# Patient Record
Sex: FEMALE | Race: WHITE | NOT HISPANIC OR LATINO | Employment: FULL TIME | URBAN - METROPOLITAN AREA
[De-identification: names, ages, dates, MRNs, and addresses within clinical notes are randomized per-mention and may not be internally consistent; named-entity substitution may affect disease eponyms.]

---

## 2017-03-24 DIAGNOSIS — J31.0 CHRONIC RHINITIS: ICD-10-CM

## 2017-03-24 DIAGNOSIS — F41.9 ANXIETY: ICD-10-CM

## 2017-03-27 RX ORDER — FLUTICASONE PROPIONATE 50 MCG
SPRAY, SUSPENSION (ML) NASAL
Qty: 16 ML | Refills: 5 | Status: SHIPPED | OUTPATIENT
Start: 2017-03-27 | End: 2017-09-22

## 2017-03-27 NOTE — TELEPHONE ENCOUNTER
Fluticasone 50 mcg       Last Written Prescription Date: 01/29/2016  Last Fill Quantity: 16g, # refills: 11    Last Office Visit with [facility], [facility] or [facility] prescribing provider:  09/16/2016   Future Office Visit:       Date of Last Asthma Action Plan Letter:   There are no preventive care reminders to display for this patient.   Asthma Control Test: No flowsheet data found.    Date of Last Spirometry Test:   No results found for this or any previous visit.      Escitalopram 10 mg     Last Written Prescription Date:   Last Fill Quantity: , # refills:   Last Office Visit with [facility] primary care provider:  09/16/2016        Last PHQ-9 score on record=   PHQ-9 SCORE 7/25/2016   Total Score 1

## 2017-03-27 NOTE — TELEPHONE ENCOUNTER
Flonase  Prescription approved per G Refill Protocol.  Janki Matias RN    Lexapro  Routing refill request to provider for review/approval because:  Drug not active on patient's medication list.  Medication stopped 09/2016  Janki Matias RN

## 2017-03-28 RX ORDER — ESCITALOPRAM OXALATE 10 MG/1
TABLET ORAL
Qty: 90 TABLET | Refills: 1 | Status: SHIPPED | OUTPATIENT
Start: 2017-03-28 | End: 2017-04-25
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
## 2017-04-20 ENCOUNTER — MYC MEDICAL ADVICE (OUTPATIENT)
Dept: FAMILY MEDICINE | Facility: OTHER | Age: 27
End: 2017-04-20

## 2017-04-23 ENCOUNTER — MYC MEDICAL ADVICE (OUTPATIENT)
Dept: FAMILY MEDICINE | Facility: OTHER | Age: 27
End: 2017-04-23

## 2017-04-23 DIAGNOSIS — F41.9 ANXIETY: ICD-10-CM

## 2017-04-25 RX ORDER — ESCITALOPRAM OXALATE 20 MG/1
20 TABLET ORAL DAILY
Qty: 90 TABLET | Refills: 1 | Status: SHIPPED | OUTPATIENT
Start: 2017-04-25 | End: 2017-09-22

## 2017-08-31 ENCOUNTER — MYC MEDICAL ADVICE (OUTPATIENT)
Dept: FAMILY MEDICINE | Facility: OTHER | Age: 27
End: 2017-08-31

## 2017-08-31 DIAGNOSIS — L70.9 ACNE, UNSPECIFIED ACNE TYPE: ICD-10-CM

## 2017-08-31 RX ORDER — DROSPIRENONE AND ETHINYL ESTRADIOL 0.03MG-3MG
1 KIT ORAL DAILY
Qty: 84 TABLET | Refills: 0 | Status: SHIPPED | OUTPATIENT
Start: 2017-08-31 | End: 2017-09-22

## 2017-08-31 NOTE — TELEPHONE ENCOUNTER
I believe she means any Friday.  I would go ahead and schedule an appointment for her on a Friday some time and then can refill her medication until that appointment.

## 2017-08-31 NOTE — TELEPHONE ENCOUNTER
OMER   Last Written Prescription Date:  84  Last Fill Quantity: 4,   # refills: 9/16/2016  Last Office Visit with [facility], [facility] or [facility] prescribing provider: 9/16/16  Future Office visit:       Routing refill request to provider for review/approval because:  Drug not on the [facility], [facility] or [facility] refill protocol or controlled substance      LM for pt to call back to clinic. Please see below. TC doesn't have appt on on Friday. Would she be willing to see another provider? Please help her setup an appt.  Routed to  for refill     Nancie Moore MA

## 2017-08-31 NOTE — TELEPHONE ENCOUNTER
Pt had called back and that day wont work so she is going to be seen on 9/22 at 740am. Please send enough medication.

## 2017-09-19 NOTE — PROGRESS NOTES
SUBJECTIVE:   CC: Aniyah Evans is an 27 year old woman who presents for preventive health visit.     Physical   Annual:     Getting at least 3 servings of Calcium per day::  Yes    Bi-annual eye exam::  Yes    Dental care twice a year::  Yes    Sleep apnea or symptoms of sleep apnea::  None    Diet::  Regular (no restrictions)    Frequency of exercise::  1 day/week    Duration of exercise::  Less than 15 minutes    Taking medications regularly::  Yes    Medication side effects::  Not applicable    Additional concerns today::  No      Patient has several moles she would like looked at.     Today's PHQ-2 Score:   PHQ-2 ( 1999 [employer]) 9/22/2017   Q1: Little interest or pleasure in doing things 1   Q2: Feeling down, depressed or hopeless 0   PHQ-2 Score 1   Q1: Little interest or pleasure in doing things Several days   Q2: Feeling down, depressed or hopeless Not at all   PHQ-2 Score 1     Answers for HPI/ROS submitted by the patient on 9/22/2017   PHQ-2 Score: 1    Abuse: Current or Past(Physical, Sexual or Emotional)- No  Do you feel safe in your environment - Yes    Social History   Substance Use Topics     Smoking status: Never Smoker     Smokeless tobacco: Never Used      Comment: no smokers in household     Alcohol use Yes      Comment: occ     The patient does not drink >3 drinks per day nor >7 drinks per week.    Reviewed orders with patient.  Reviewed health maintenance and updated orders accordingly - Yes    Mammogram not appropriate for this patient based on age.    Pertinent mammograms are reviewed under the imaging tab.  History of abnormal Pap smear: NO - age 21-29 PAP every 3 years recommended    Reviewed and updated as needed this visit by clinical staff  Tobacco  Allergies  Meds  Problems  Med Hx  Surg Hx  Fam Hx  Soc Hx          Reviewed and updated as needed this visit by Provider  Allergies  Meds  Problems          ROS:  C: NEGATIVE for fever, chills, change in weight  I: has multiple  "nevi, she isn't sure if they have changed, but thinks some may be different  E: NEGATIVE for vision changes or irritation  ENT: NEGATIVE for ear, mouth and throat problems  R: NEGATIVE for significant cough or SOB  B: NEGATIVE for masses, tenderness or discharge  CV: NEGATIVE for chest pain, palpitations or peripheral edema  GI: NEGATIVE for nausea, abdominal pain, heartburn, or change in bowel habits  : NEGATIVE for unusual urinary or vaginal symptoms. Periods are regular.  M: NEGATIVE for significant arthralgias or myalgia  N: NEGATIVE for weakness, dizziness or paresthesias  P: NEGATIVE for changes in mood or affect     OBJECTIVE:   /76 (BP Location: Left arm, Patient Position: Chair, Cuff Size: Adult Large)  Pulse 84  Temp 98.4  F (36.9  C) (Temporal)  Resp 16  Ht 5' 5.35\" (1.66 m)  Wt 175 lb (79.4 kg)  SpO2 98%  Breastfeeding? No  BMI 28.81 kg/m2  EXAM:  GENERAL: healthy, alert and no distress  EYES: Eyes grossly normal to inspection, PERRL and conjunctivae and sclerae normal  HENT: ear canals and TM's normal, nose and mouth without ulcers or lesions  NECK: no adenopathy, no asymmetry, masses, or scars and thyroid normal to palpation  RESP: lungs clear to auscultation - no rales, rhonchi or wheezes  BREAST: normal without masses, tenderness or nipple discharge and no palpable axillary masses or adenopathy  CV: regular rate and rhythm, normal S1 S2, no S3 or S4, no murmur, click or rub, no peripheral edema and peripheral pulses strong  ABDOMEN: soft, nontender, no hepatosplenomegaly, no masses and bowel sounds normal  MS: no gross musculoskeletal defects noted, no edema  SKIN:  Multiple nevi ranging in size from 1-5 mm, some with irregular borders or irregular pigmentation  NEURO: Normal strength and tone, mentation intact and speech normal  PSYCH: mentation appears normal, affect normal/bright    ASSESSMENT/PLAN:   1. Encounter for routine adult health examination without abnormal " "findings      2. Chronic rhinitis, unspecified type  Controlled    - fluticasone (FLONASE) 50 MCG/ACT spray; PLACE 1 TO 2 SPRAYS INTO BOTH NOSTRILS DAILY AS NEEDED  Dispense: 16 mL; Refill: 11    3. Anxiety  Feels controlled on Lexapro.    - escitalopram (LEXAPRO) 20 MG tablet; Take 1 tablet (20 mg) by mouth daily  Dispense: 90 tablet; Refill: 3    4. Acne vulgaris  Controlled.    - drospirenone-ethinyl estradiol (OMER) 3-0.03 MG per tablet; Take 1 tablet by mouth daily  Dispense: 84 tablet; Refill: 3  - clindamycin (CLEOCIN T) 1 % solution; Apply topically 2 times daily ### DO NOT FILL NOW.  Please update patient's profile to reflect additional refills.  ####  Dispense: 60 mL; Refill: 11    5. Atypical nevi  Due to multiple mildly atypical nevi, will refer to Dermatology to evaluate and determine which may need biopsy.    - DERMATOLOGY REFERRAL    COUNSELING:  Reviewed preventive health counseling, as reflected in patient instructions         reports that she has never smoked. She has never used smokeless tobacco.    Estimated body mass index is 28.81 kg/(m^2) as calculated from the following:    Height as of this encounter: 5' 5.35\" (1.66 m).    Weight as of this encounter: 175 lb (79.4 kg).   Weight management plan: Discussed healthy diet and exercise guidelines and patient will follow up in 12 months in clinic to re-evaluate.    Counseling Resources:  ATP IV Guidelines  Pooled Cohorts Equation Calculator  Breast Cancer Risk Calculator  FRAX Risk Assessment  ICSI Preventive Guidelines  Dietary Guidelines for Americans, 2010  USDA's MyPlate  ASA Prophylaxis  Lung CA Screening    Jaycee Schultz MD  [facility]"

## 2017-09-22 ENCOUNTER — OFFICE VISIT (OUTPATIENT)
Dept: FAMILY MEDICINE | Facility: OTHER | Age: 27
End: 2017-09-22
Payer: COMMERCIAL

## 2017-09-22 VITALS
BODY MASS INDEX: 29.16 KG/M2 | TEMPERATURE: 98.4 F | OXYGEN SATURATION: 98 % | HEIGHT: 65 IN | HEART RATE: 84 BPM | DIASTOLIC BLOOD PRESSURE: 76 MMHG | WEIGHT: 175 LBS | SYSTOLIC BLOOD PRESSURE: 114 MMHG | RESPIRATION RATE: 16 BRPM

## 2017-09-22 DIAGNOSIS — J31.0 CHRONIC RHINITIS, UNSPECIFIED TYPE: ICD-10-CM

## 2017-09-22 DIAGNOSIS — F41.9 ANXIETY: ICD-10-CM

## 2017-09-22 DIAGNOSIS — Z00.00 ENCOUNTER FOR ROUTINE ADULT HEALTH EXAMINATION WITHOUT ABNORMAL FINDINGS: Primary | ICD-10-CM

## 2017-09-22 DIAGNOSIS — D22.9 ATYPICAL NEVI: ICD-10-CM

## 2017-09-22 DIAGNOSIS — L70.0 ACNE VULGARIS: ICD-10-CM

## 2017-09-22 PROCEDURE — 99395 PREV VISIT EST AGE 18-39: CPT | Performed by: FAMILY MEDICINE

## 2017-09-22 RX ORDER — FLUTICASONE PROPIONATE 50 MCG
SPRAY, SUSPENSION (ML) NASAL
Qty: 16 ML | Refills: 11 | Status: SHIPPED | OUTPATIENT
Start: 2017-09-22 | End: 2018-10-02

## 2017-09-22 RX ORDER — DROSPIRENONE AND ETHINYL ESTRADIOL 0.03MG-3MG
1 KIT ORAL DAILY
Qty: 84 TABLET | Refills: 3 | Status: SHIPPED | OUTPATIENT
Start: 2017-09-22 | End: 2017-11-02

## 2017-09-22 RX ORDER — ESCITALOPRAM OXALATE 20 MG/1
20 TABLET ORAL DAILY
Qty: 90 TABLET | Refills: 3 | Status: SHIPPED | OUTPATIENT
Start: 2017-09-22 | End: 2018-10-02

## 2017-09-22 RX ORDER — CLINDAMYCIN PHOSPHATE 11.9 MG/ML
SOLUTION TOPICAL 2 TIMES DAILY
Qty: 60 ML | Refills: 11 | Status: SHIPPED | OUTPATIENT
Start: 2017-09-22 | End: 2018-10-02

## 2017-09-22 ASSESSMENT — PAIN SCALES - GENERAL: PAINLEVEL: NO PAIN (0)

## 2017-09-22 NOTE — MR AVS SNAPSHOT
After Visit Summary   9/22/2017    Aniyah Evans    MRN: [id number]           Patient Information     Date Of Birth          1990        Visit Information        Provider Department      9/22/2017 7:40 AM Jaycee Schultz MD [facility]        Today's Diagnoses     Encounter for routine adult health examination without abnormal findings    -  1    Chronic rhinitis, unspecified type        Anxiety        Acne vulgaris        Atypical nevi           Follow-ups after your visit        Additional Services     DERMATOLOGY REFERRAL       Your provider has referred you to: [facility]: [facility] [phone]   [email or web address]  [facility] Shelly Gutierrez [phone]   [email or web address]    Please be aware that coverage of these services is subject to the terms and limitations of your health insurance plan.  Call member services at your health plan with any benefit or coverage questions.      Please bring the following with you to your appointment:    (1) Any X-Rays, CTs or MRIs which have been performed.  Contact the facility where they were done to arrange for  prior to your scheduled appointment.    (2) List of current medications  (3) This referral request   (4) Any documents/labs given to you for this referral                  Who to contact     If you have questions or need follow up information about today's clinic visit or your schedule please contact [facility] directly at [phone].  Normal or non-critical lab and imaging results will be communicated to you by MyChart, letter or phone within 4 business days after the clinic has received the results. If you do not hear from us within 7 days, please contact the clinic through MyChart or phone. If you have a critical or abnormal lab result, we will notify you by phone as soon as  "possible.  Submit refill requests through Heliatek or call your pharmacy and they will forward the refill request to us. Please allow 3 business days for your refill to be completed.          Additional Information About Your Visit        OncoGenexharLoctronix Information     Heliatek gives you secure access to your electronic health record. If you see a primary care provider, you can also send messages to your care team and make appointments. If you have questions, please call your primary care clinic.  If you do not have a primary care provider, please call [phone] and they will assist you.        Care EveryWhere ID     This is your Care EveryWhere ID. This could be used by other organizations to access your [facility] medical records  [id number]        Your Vitals Were     Pulse Temperature Respirations Height Pulse Oximetry Breastfeeding?    84 98.4  F (36.9  C) (Temporal) 16 5' 5.35\" (1.66 m) 98% No    BMI (Body Mass Index)                   28.81 kg/m2            Blood Pressure from Last 3 Encounters:   09/22/17 114/76   09/16/16 120/66   09/08/16 115/65    Weight from Last 3 Encounters:   09/22/17 175 lb (79.4 kg)   09/16/16 159 lb (72.1 kg)   09/07/16 145 lb (65.8 kg)              We Performed the Following     DERMATOLOGY REFERRAL          Today's Medication Changes          These changes are accurate as of: 9/22/17  8:14 AM.  If you have any questions, ask your nurse or doctor.               These medicines have changed or have updated prescriptions.        Dose/Directions    clindamycin 1 % solution   Commonly known as:  CLEOCIN T   This may have changed:  additional instructions   Used for:  Acne vulgaris   Changed by:  Jaycee Schultz MD        Apply topically 2 times daily ### DO NOT FILL NOW.  Please update patient's profile to reflect additional refills.  ####   Quantity:  60 mL   Refills:  11       fluticasone 50 MCG/ACT spray   Commonly known as:  FLONASE   This may have changed:  See the new " instructions.   Used for:  Chronic rhinitis, unspecified type   Changed by:  Jaycee Schultz MD        PLACE 1 TO 2 SPRAYS INTO BOTH NOSTRILS DAILY AS NEEDED   Quantity:  16 mL   Refills:  11            Where to get your medicines      These medications were sent to [facility] [address] [place] AT [facility] [address] [place], [place] [address]     Phone:  [phone]     clindamycin 1 % solution    drospirenone-ethinyl estradiol 3-0.03 MG per tablet    escitalopram 20 MG tablet    fluticasone 50 MCG/ACT spray                Primary Care Provider Office Phone # Fax #    Jaycee Schultz [phone]       [address] [place] [address] [address]  [facility] [address]        Equal Access to Services     [facility]: Hadii alfred amaya hadasho Soomaali, waaxda luqadaha, qaybta kaalmada adeegyada, jaron ryan hayhao carreon . So [facility] [phone].    ATENCIÓN: Si habla español, tiene a bueno disposición servicios gratuitos de asistencia lingüística. [facility] [phone].    We comply with applicable federal civil rights laws and [place] laws. We do not discriminate on the basis of race, color, national origin, age, disability sex, sexual orientation or gender identity.            Thank you!     Thank you for choosing [facility]  for your care. Our goal is always to provide you with excellent care. Hearing back from our patients is one way we can continue to improve our services. Please take a few minutes to complete the written survey that you may receive in the mail after your visit with us. Thank you!             Your Updated Medication List - Protect others around you: Learn how to safely use, store and throw away your medicines at www.disposemymeds.org.          This list is accurate as of: 9/22/17  8:14 AM.  Always use your most recent med list.                   Brand Name Dispense Instructions for use Diagnosis    AZO  CRANBERRY PO           clindamycin 1 % solution    CLEOCIN T    60 mL    Apply topically 2 times daily ### DO NOT FILL NOW.  Please update patient's profile to reflect additional refills.  ####    Acne vulgaris       drospirenone-ethinyl estradiol 3-0.03 MG per tablet    OMER    84 tablet    Take 1 tablet by mouth daily    Acne vulgaris       escitalopram 20 MG tablet    LEXAPRO    90 tablet    Take 1 tablet (20 mg) by mouth daily    Anxiety       fluticasone 50 MCG/ACT spray    FLONASE    16 mL    PLACE 1 TO 2 SPRAYS INTO BOTH NOSTRILS DAILY AS NEEDED    Chronic rhinitis, unspecified type

## 2017-09-22 NOTE — NURSING NOTE
"Chief Complaint   Patient presents with     Physical     Panel Management     height, flu, lipid       Initial /76 (BP Location: Left arm, Patient Position: Chair, Cuff Size: Adult Large)  Pulse 84  Temp 98.4  F (36.9  C) (Temporal)  Resp 16  Ht 5' 5.35\" (1.66 m)  Wt 175 lb (79.4 kg)  SpO2 98%  Breastfeeding? No  BMI 28.81 kg/m2 Estimated body mass index is 28.81 kg/(m^2) as calculated from the following:    Height as of this encounter: 5' 5.35\" (1.66 m).    Weight as of this encounter: 175 lb (79.4 kg).  Medication Reconciliation: complete   Danielle Power CMA      "

## 2017-10-31 DIAGNOSIS — L70.9 ACNE, UNSPECIFIED ACNE TYPE: ICD-10-CM

## 2017-11-02 RX ORDER — DROSPIRENONE AND ETHINYL ESTRADIOL 0.03MG-3MG
KIT ORAL
Qty: 84 TABLET | Refills: 3 | Status: SHIPPED | OUTPATIENT
Start: 2017-11-02 | End: 2018-10-02

## 2017-11-02 NOTE — TELEPHONE ENCOUNTER
Routing refill request to provider for review/approval because:  Drug not active on patient's medication list    Emma Whiting RN

## 2018-01-30 ENCOUNTER — OFFICE VISIT (OUTPATIENT)
Dept: FAMILY MEDICINE | Facility: OTHER | Age: 28
End: 2018-01-30
Payer: COMMERCIAL

## 2018-01-30 VITALS
TEMPERATURE: 98.2 F | SYSTOLIC BLOOD PRESSURE: 116 MMHG | OXYGEN SATURATION: 96 % | RESPIRATION RATE: 16 BRPM | DIASTOLIC BLOOD PRESSURE: 82 MMHG | WEIGHT: 175 LBS | BODY MASS INDEX: 29.88 KG/M2 | HEART RATE: 92 BPM | HEIGHT: 64 IN

## 2018-01-30 DIAGNOSIS — Z20.2 EXPOSURE TO CHLAMYDIA: ICD-10-CM

## 2018-01-30 DIAGNOSIS — R07.0 THROAT PAIN: Primary | ICD-10-CM

## 2018-01-30 LAB
DEPRECATED S PYO AG THROAT QL EIA: NORMAL
SPECIMEN SOURCE: NORMAL

## 2018-01-30 PROCEDURE — 87491 CHLMYD TRACH DNA AMP PROBE: CPT | Performed by: FAMILY MEDICINE

## 2018-01-30 PROCEDURE — 87880 STREP A ASSAY W/OPTIC: CPT | Performed by: FAMILY MEDICINE

## 2018-01-30 PROCEDURE — 87591 N.GONORRHOEAE DNA AMP PROB: CPT | Performed by: FAMILY MEDICINE

## 2018-01-30 PROCEDURE — 87081 CULTURE SCREEN ONLY: CPT | Performed by: FAMILY MEDICINE

## 2018-01-30 PROCEDURE — 99213 OFFICE O/P EST LOW 20 MIN: CPT | Performed by: FAMILY MEDICINE

## 2018-01-30 RX ORDER — AZITHROMYCIN 500 MG/1
1000 TABLET, FILM COATED ORAL ONCE
Qty: 2 TABLET | Refills: 0 | Status: SHIPPED | OUTPATIENT
Start: 2018-01-30 | End: 2018-01-30

## 2018-01-30 NOTE — NURSING NOTE
"Chief Complaint   Patient presents with     Pharyngitis     STD       Initial /82 (BP Location: Left arm, Patient Position: Chair, Cuff Size: Adult Regular)  Pulse 92  Temp 98.2  F (36.8  C) (Temporal)  Resp 16  Ht 5' 4\" (1.626 m)  Wt 175 lb (79.4 kg)  SpO2 96%  BMI 30.04 kg/m2 Estimated body mass index is 30.04 kg/(m^2) as calculated from the following:    Height as of this encounter: 5' 4\" (1.626 m).    Weight as of this encounter: 175 lb (79.4 kg).  Medication Reconciliation: complete   Danielle Power CMA      "

## 2018-01-30 NOTE — MR AVS SNAPSHOT
"              After Visit Summary   1/30/2018    Aniyah Evans    MRN: [id number]           Patient Information     Date Of Birth          1990        Visit Information        Provider Department      1/30/2018 12:00 PM Jaycee Schultz MD [facility]        Today's Diagnoses     Throat pain    -  1    Exposure to chlamydia           Follow-ups after your visit        Who to contact     If you have questions or need follow up information about today's clinic visit or your schedule please contact [facility] directly at [phone].  Normal or non-critical lab and imaging results will be communicated to you by PublicRelayhart, letter or phone within 4 business days after the clinic has received the results. If you do not hear from us within 7 days, please contact the clinic through Peekyt or phone. If you have a critical or abnormal lab result, we will notify you by phone as soon as possible.  Submit refill requests through EverybodyCar or call your pharmacy and they will forward the refill request to us. Please allow 3 business days for your refill to be completed.          Additional Information About Your Visit        MyChart Information     EverybodyCar gives you secure access to your electronic health record. If you see a primary care provider, you can also send messages to your care team and make appointments. If you have questions, please call your primary care clinic.  If you do not have a primary care provider, please call [phone] and they will assist you.        Care EveryWhere ID     This is your Care EveryWhere ID. This could be used by other organizations to access your [facility] medical records  [id number]        Your Vitals Were     Pulse Temperature Respirations Height Pulse Oximetry BMI (Body Mass Index)    92 98.2  F (36.8  C) (Temporal) 16 5' 4\" (1.626 m) 96% 30.04 kg/m2       Blood Pressure from Last 3 Encounters:   01/30/18 116/82   09/22/17 114/76   09/16/16 " 120/66    Weight from Last 3 Encounters:   01/30/18 175 lb (79.4 kg)   09/22/17 175 lb (79.4 kg)   09/16/16 159 lb (72.1 kg)              We Performed the Following     Beta strep group A culture     CHLAMYDIA TRACHOMATIS PCR     NEISSERIA GONORRHOEA PCR     Strep, Rapid Screen          Today's Medication Changes          These changes are accurate as of 1/30/18  1:56 PM.  If you have any questions, ask your nurse or doctor.               Start taking these medicines.        Dose/Directions    azithromycin 500 MG tablet   Commonly known as:  ZITHROMAX   Used for:  Exposure to chlamydia   Started by:  Jaycee Schultz MD        Dose:  1000 mg   Take 2 tablets (1,000 mg) by mouth once for 1 dose   Quantity:  2 tablet   Refills:  0            Where to get your medicines      These medications were sent to [facility] [address] [place] [address] [facility] AT [facility] [address] [facility]  [address] [facility], [place] [address]     Phone:  [phone]     azithromycin 500 MG tablet                Primary Care Provider Office Phone # Fax #    Jaycee Schultz [phone]       [address] [facility] [address]  [place] [address]        Equal Access to Services     [facility]: Hadii alfred amyaa hadasho Soomaali, waaxda luqadaha, qaybta kaalmada adeegyada, jaron dutta. So [facility] [phone].    ATENCIÓN: Si habla español, tiene a bueno disposición servicios gratuitos de asistencia lingüística. Llame al [phone].    We comply with applicable federal civil rights laws and [place] laws. We do not discriminate on the basis of race, color, national origin, age, disability, sex, sexual orientation, or gender identity.            Thank you!     Thank you for choosing [facility]  for your care. Our goal is always to provide you with excellent care. Hearing back from our patients is one way we can continue to improve our services. Please take a few minutes  to complete the written survey that you may receive in the mail after your visit with us. Thank you!             Your Updated Medication List - Protect others around you: Learn how to safely use, store and throw away your medicines at www.disposemymeds.org.          This list is accurate as of 1/30/18  1:56 PM.  Always use your most recent med list.                   Brand Name Dispense Instructions for use Diagnosis    azithromycin 500 MG tablet    ZITHROMAX    2 tablet    Take 2 tablets (1,000 mg) by mouth once for 1 dose    Exposure to chlamydia       clindamycin 1 % solution    CLEOCIN T    60 mL    Apply topically 2 times daily ### DO NOT FILL NOW.  Please update patient's profile to reflect additional refills.  ####    Acne vulgaris       CRANBERRY CONCENTRATE/VITAMINC 140-100 MG Caps   Generic drug:  Cranberry-Vitamin C           escitalopram 20 MG tablet    LEXAPRO    90 tablet    Take 1 tablet (20 mg) by mouth daily    Anxiety       fluticasone 50 MCG/ACT spray    FLONASE    16 mL    PLACE 1 TO 2 SPRAYS INTO BOTH NOSTRILS DAILY AS NEEDED    Chronic rhinitis, unspecified type       SANDHYA 3-0.03 MG per tablet   Generic drug:  drospirenone-ethinyl estradiol     84 tablet    TAKE 1 TABLET BY MOUTH DAILY    Acne, unspecified acne type
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
## 2018-01-31 LAB
BACTERIA SPEC CULT: NORMAL
C TRACH DNA SPEC QL NAA+PROBE: NEGATIVE
N GONORRHOEA DNA SPEC QL NAA+PROBE: NEGATIVE
SPECIMEN SOURCE: NORMAL

## 2018-10-02 ENCOUNTER — OFFICE VISIT (OUTPATIENT)
Dept: FAMILY MEDICINE | Facility: OTHER | Age: 28
End: 2018-10-02
Payer: COMMERCIAL

## 2018-10-02 VITALS
BODY MASS INDEX: 27.46 KG/M2 | TEMPERATURE: 98.7 F | RESPIRATION RATE: 16 BRPM | HEART RATE: 82 BPM | OXYGEN SATURATION: 95 % | DIASTOLIC BLOOD PRESSURE: 70 MMHG | WEIGHT: 160 LBS | SYSTOLIC BLOOD PRESSURE: 104 MMHG

## 2018-10-02 DIAGNOSIS — R07.0 THROAT PAIN: ICD-10-CM

## 2018-10-02 DIAGNOSIS — J31.0 CHRONIC RHINITIS: ICD-10-CM

## 2018-10-02 DIAGNOSIS — L70.0 ACNE VULGARIS: ICD-10-CM

## 2018-10-02 DIAGNOSIS — Z30.41 ENCOUNTER FOR SURVEILLANCE OF CONTRACEPTIVE PILLS: ICD-10-CM

## 2018-10-02 DIAGNOSIS — L70.9 ACNE, UNSPECIFIED ACNE TYPE: ICD-10-CM

## 2018-10-02 DIAGNOSIS — F41.9 ANXIETY: ICD-10-CM

## 2018-10-02 DIAGNOSIS — J02.9 VIRAL PHARYNGITIS: Primary | ICD-10-CM

## 2018-10-02 LAB
DEPRECATED S PYO AG THROAT QL EIA: NORMAL
SPECIMEN SOURCE: NORMAL

## 2018-10-02 PROCEDURE — 87081 CULTURE SCREEN ONLY: CPT | Performed by: PHYSICIAN ASSISTANT

## 2018-10-02 PROCEDURE — 99214 OFFICE O/P EST MOD 30 MIN: CPT | Performed by: PHYSICIAN ASSISTANT

## 2018-10-02 PROCEDURE — 87880 STREP A ASSAY W/OPTIC: CPT | Performed by: PHYSICIAN ASSISTANT

## 2018-10-02 RX ORDER — CLINDAMYCIN PHOSPHATE 11.9 MG/ML
SOLUTION TOPICAL 2 TIMES DAILY
Qty: 60 ML | Refills: 11 | Status: SHIPPED | OUTPATIENT
Start: 2018-10-02 | End: 2021-10-21

## 2018-10-02 RX ORDER — ESCITALOPRAM OXALATE 20 MG/1
20 TABLET ORAL DAILY
Qty: 90 TABLET | Refills: 3 | Status: SHIPPED | OUTPATIENT
Start: 2018-10-02 | End: 2019-10-16

## 2018-10-02 RX ORDER — FLUTICASONE PROPIONATE 50 MCG
SPRAY, SUSPENSION (ML) NASAL
Qty: 16 ML | Refills: 0 | Status: CANCELLED | OUTPATIENT
Start: 2018-10-02

## 2018-10-02 RX ORDER — FLUTICASONE PROPIONATE 50 MCG
SPRAY, SUSPENSION (ML) NASAL
Qty: 16 ML | Refills: 3 | Status: SHIPPED | OUTPATIENT
Start: 2018-10-02 | End: 2019-10-16

## 2018-10-02 RX ORDER — DROSPIRENONE AND ETHINYL ESTRADIOL 0.03MG-3MG
1 KIT ORAL DAILY
Qty: 84 TABLET | Refills: 3 | Status: SHIPPED | OUTPATIENT
Start: 2018-10-02 | End: 2019-08-30

## 2018-10-02 RX ORDER — DROSPIRENONE AND ETHINYL ESTRADIOL 0.03MG-3MG
KIT ORAL
Qty: 84 TABLET | Refills: 0 | Status: CANCELLED | OUTPATIENT
Start: 2018-10-02

## 2018-10-02 ASSESSMENT — ANXIETY QUESTIONNAIRES
3. WORRYING TOO MUCH ABOUT DIFFERENT THINGS: NOT AT ALL
1. FEELING NERVOUS, ANXIOUS, OR ON EDGE: SEVERAL DAYS
IF YOU CHECKED OFF ANY PROBLEMS ON THIS QUESTIONNAIRE, HOW DIFFICULT HAVE THESE PROBLEMS MADE IT FOR YOU TO DO YOUR WORK, TAKE CARE OF THINGS AT HOME, OR GET ALONG WITH OTHER PEOPLE: NOT DIFFICULT AT ALL
4. TROUBLE RELAXING: NOT AT ALL
GAD7 TOTAL SCORE: 1
7. FEELING AFRAID AS IF SOMETHING AWFUL MIGHT HAPPEN: NOT AT ALL
6. BECOMING EASILY ANNOYED OR IRRITABLE: NOT AT ALL
5. BEING SO RESTLESS THAT IT IS HARD TO SIT STILL: NOT AT ALL
2. NOT BEING ABLE TO STOP OR CONTROL WORRYING: NOT AT ALL

## 2018-10-02 ASSESSMENT — PAIN SCALES - GENERAL: PAINLEVEL: MODERATE PAIN (4)

## 2018-10-02 NOTE — PROGRESS NOTES
SUBJECTIVE:   Aniyah Evans is a 28 year old female who presents to clinic today for the following health issues:    Strep swab -CDL    HPI  Acute Illness   Acute illness concerns: sore throat  Onset: yesterday    Fever: no    Chills/Sweats: no    Headache (location?): no    Sinus Pressure:YES    Conjunctivitis:  no    Ear Pain: no    Rhinorrhea: no    Congestion: YES    Sore Throat: YES     Cough: YES-non-productive    Wheeze: no     Decreased Appetite: no     Nausea: no    Vomiting: no    Diarrhea:  no    Dysuria/Freq.: no    Fatigue/Achiness: YES    Sick/Strep Exposure: no     Therapies Tried and outcome: none    Medication Followup of Flonase    Taking Medication as prescribed: yes    Side Effects:  None    Medication Helping Symptoms:  Yes    - Sometimes can take a week off but mostly uses year round        Medication Followup of OCP    Taking Medication as prescribed: yes    Side Effects:  None    Medication Helping Symptoms:  yes     Medication Followup of Clindamycin solution     Taking Medication as prescribed: yes    Side Effects:  None    Medication Helping Symptoms:  yes       Anxiety Follow-Up    Status since last visit: No change    Other associated symptoms:None    Complicating factors:   Significant life event: No   Current substance abuse: None  Depression symptoms: No    GERMAN-7 SCORE 3/29/2016 7/25/2016 10/2/2018   Total Score 1 0 1       PHQ-9 SCORE 3/29/2016 7/25/2016 10/2/2018   Total Score 1 1 1           Problem list and histories reviewed & adjusted, as indicated.  Additional history: as documented    Labs reviewed in EPIC    ROS:  Constitutional, HEENT, cardiovascular, pulmonary, gi and gu systems are negative, except as otherwise noted.    OBJECTIVE:   /70  Pulse 82  Temp 98.7  F (37.1  C) (Temporal)  Resp 16  Wt 160 lb (72.6 kg)  LMP 09/30/2018 (Exact Date)  SpO2 95%  Breastfeeding? No  BMI 27.46 kg/m2  Body mass index is 27.46 kg/(m^2).  GENERAL APPEARANCE: mildly ill  appearing, alert and no distress  EYES: Eyes grossly normal to inspection, PERRLA, conjunctivae and sclerae without injection or discharge, EOM intact   HENT: Bilateral ear canals without erythema or cerumen, bilateral TM's pearly grey with normal light reflex, no effusion, injection, or bulging, nasal turbinates without swelling, erythema, or discharge, mouth without ulcers or lesions, oropharynx mildly erythematous without edema or exudate, oral mucous membranes moist, no sinus tenderness   NECK: No adenopathy in cervical or supraclavicular regions  RESP: Lungs clear to auscultation - no rales, rhonchi or wheezes   CV: Regular rates and rhythm, normal S1 S2, no S3 or S4, no murmur, click or rub  MS: No musculoskeletal defects are noted and gait is age appropriate without ataxia   SKIN: No suspicious lesions or rashes, hydration status appears adeuqate with normal skin turgor   PSYCH: Alert and oriented x3; speech- coherent , normal rate and volume; able to articulate logical thoughts, able to abstract reason, no tangential thoughts, no hallucinations or delusions, mentation appears normal, Mood is euthymic. Affect is appropriate for this mood state and bright. Thought content is free of suicidal ideation, hallucinations, and delusions. Dress is adequate and upkept. Eye contact is good during conversation.       Diagnostic Test Results:  Strep - negative       ASSESSMENT/PLAN:       ICD-10-CM    1. Viral pharyngitis J02.9    2. Throat pain R07.0 Strep, Rapid Screen     Beta strep group A culture   3. Chronic rhinitis J31.0 fluticasone (FLONASE) 50 MCG/ACT spray   4. Acne vulgaris L70.0 clindamycin (CLEOCIN T) 1 % solution   5. Anxiety F41.9 escitalopram (LEXAPRO) 20 MG tablet   6. Encounter for surveillance of contraceptive pills Z30.41 drospirenone-ethinyl estradiol (SANDHYA) 3-0.03 MG per tablet       1 & 2.   - Negative strep, minimal exam findings with only 1 day of symptoms   - Will await strep culture  - Likely  viral, treatment with OTC medications as needed   - Recommend salt water gargle     3. Chronic rhinitis   - Flonase helps, uses pretty much year round  - Reviewed use and side effects, refilled     4.&5. Acne   - On OCP and Clindamycin solution   - Reviewed both use and side effects, refilled     6. Anxiety   - Stable on Lexapro 20 mg  - Reviewed use and side effects, refilled     The patient indicates understanding of these issues and agrees with the plan.    Follow up: 1 year or PRN         Yasmin Patrick PA-C  [facility]

## 2018-10-02 NOTE — MR AVS SNAPSHOT
After Visit Summary   10/2/2018    Aniyah Evans    MRN: [id number]           Patient Information     Date Of Birth          1990        Visit Information        Provider Department      10/2/2018 12:00 PM Yasmin Patrick PA-C [facility]        Today's Diagnoses     Viral pharyngitis    -  1    Throat pain          Care Instructions               Sore Throat              What is a sore throat?   Sore throat is a common symptom that ranges in severity from just a sense of scratchiness to severe pain.   Pharyngitis is the medical term for sore throat.   How does it occur?   Sore throat is caused by inflammation of the throat (pharynx). The pharynx is the area behind the tonsils. A sore throat may be the first symptom of usually mild illnesses such as a cold or the flu or of more severe illnesses such as mononucleosis or scarlet fever.   A sore throat that comes on suddenly is called acute pharyngitis. It can be caused by bacteria or viruses. A sore throat that lasts for a long time is called chronic pharyngitis. It occurs when a respiratory, sinus, or mouth infection spreads to the throat.   Other causes of sore throats include:   hay fever   cigarette smoking or secondhand smoke   breathing heavily polluted air or chemical fumes   swallowing sharp foods that hurt the lining of the throat, such as a tortilla chip   dry air   heartburn (gastric reflux)   postnasal drip from draining sinuses.   What are the symptoms?   Symptoms may include:   a raw feeling in the throat that makes breathing, swallowing, and speaking painful   redness of the throat   fever   hoarseness   pus in your throat   tender, swollen glands (lymph nodes) in your neck   earache (you may feel pain in your ears even though the problem is in your throat).   How is it diagnosed?   Your healthcare provider will ask about your recent medical history and your symptoms and examine your throat. Your  provider also will examine you for signs of other illness, such as sinus, chest, or ear infections.   Just by looking at your throat, it is often hard for your healthcare provider to decide whether a virus or bacteria are causing your sore throat. Your provider may swab your throat to test for strep infection.   How is it treated?   Usually no specific medical treatment is needed if a virus is causing the sore throat. The throat most often gets better on its own within 5 to 7 days. Antibiotic medicine does not cure viral pharyngitis.   For acute pharyngitis caused by bacteria, your healthcare provider may prescribe an antibiotic.   For chronic pharyngitis, your provider will look for other causes, such as allergies.   How long will the effects last?   Viral pharyngitis often goes away in 5 to 7 days.   If you have bacterial pharyngitis, you will feel better after you have taken antibiotics for 2 to 3 days. You must, though, take all of your antibiotic even when you are feeling better. If you don't take all of it, your sore throat could come back.   How can I take care of myself?   Do not smoke.   Avoid secondhand smoke and other air pollutants.   Use a cool mist humidifier to add moisture to the air.   Get plenty of rest.   You may want to rest your throat by talking less and eating a diet that is mostly liquid or soft for a day or two. Avoid salty or spicy foods and citrus fruits.   Nonprescription throat lozenges and mouthwashes should help relieve the soreness.   Gargling with warm saltwater and drinking warm liquids may help. (You can make a saltwater solution by adding 1/4 teaspoon of salt to 8 ounces, or 240 mL, of warm water.)   A nonprescription pain reliever such as aspirin, acetaminophen, or ibuprofen may ease general aches and pains. Check with your healthcare provider before you give any medicine that contains aspirin or salicylates to a child or teen. This includes medicines like baby aspirin, some cold  medicines, and Pepto Bismol. Children and teens who take aspirin are at risk for a serious illness called Reye's syndrome.   If your sore throat lasts for more than a few days, call your healthcare provider.   How can I prevent a sore throat?   The following suggestions may help prevent a sore throat:   Don't share eating and drinking utensils with others.   Wash your hands often.   Don't let your nose or mouth touch public telephones or drinking fountains.   Avoid close contact with other people who have a sore throat.   Stay indoors as much as possible on high-pollution days.   Don't stay in areas where there is heavy smoke from cigarettes.   Use a humidifier in your home if the air is quite dry.               Published by [employer].  This content is reviewed periodically and is subject to change as new health information becomes available. The information is intended to inform and educate and is not a replacement for medical evaluation, advice, diagnosis or treatment by a healthcare professional.   Developed by [employer].   ? 2010 [employer] and/or its affiliates. All Rights Reserved.   [employer]   Clinical [employer] 2011                Follow-ups after your visit        Who to contact     If you have questions or need follow up information about today's clinic visit or your schedule please contact [facility] directly at [phone].  Normal or non-critical lab and imaging results will be communicated to you by MyChart, letter or phone within 4 business days after the clinic has received the results. If you do not hear from us within 7 days, please contact the clinic through [email or web address] or phone. If you have a critical or abnormal lab result, we will notify you by phone as soon as possible.  Submit refill requests through [employer] or call your pharmacy and they will forward the refill request to us. Please allow 3 business days for your refill to be completed.          Additional  Information About Your Visit        MyChart Information     Hooked gives you secure access to your electronic health record. If you see a primary care provider, you can also send messages to your care team and make appointments. If you have questions, please call your primary care clinic.  If you do not have a primary care provider, please call [phone] and they will assist you.        Care EveryWhere ID     This is your Care EveryWhere ID. This could be used by other organizations to access your [facility] medical records  [id number]        Your Vitals Were     Pulse Temperature Respirations Last Period Pulse Oximetry Breastfeeding?    82 98.7  F (37.1  C) (Temporal) 16 09/30/2018 (Exact Date) 95% No    BMI (Body Mass Index)                   27.46 kg/m2            Blood Pressure from Last 3 Encounters:   10/02/18 104/70   01/30/18 116/82   09/22/17 114/76    Weight from Last 3 Encounters:   10/02/18 160 lb (72.6 kg)   01/30/18 175 lb (79.4 kg)   09/22/17 175 lb (79.4 kg)              We Performed the Following     Beta strep group A culture     Strep, Rapid Screen        Primary Care Provider Office Phone # Fax #    Jaycee F MD Antoine [phone]       [address]  [facility] [address]        Equal Access to Services     CARIDAD UMANZOR : Hadii alfred ku hadasho Soomaali, waaxda luqadaha, qaybta kaalmada adeegyada, jaron ryan hayhao carreon . So [facility] [phone].    ATENCIÓN: Si habla español, tiene a bueno disposición servicios gratuitos de asistencia lingüística. Llame al [phone].    We comply with applicable federal civil rights laws and [place] laws. We do not discriminate on the basis of race, color, national origin, age, disability, sex, sexual orientation, or gender identity.            Thank you!     Thank you for choosing [facility]  for your care. Our goal is always to provide you with excellent care. Hearing back from our patients is one  way we can continue to improve our services. Please take a few minutes to complete the written survey that you may receive in the mail after your visit with us. Thank you!             Your Updated Medication List - Protect others around you: Learn how to safely use, store and throw away your medicines at www.disposemymeds.org.          This list is accurate as of 10/2/18 12:30 PM.  Always use your most recent med list.                   Brand Name Dispense Instructions for use Diagnosis    clindamycin 1 % solution    CLEOCIN T    60 mL    Apply topically 2 times daily ### DO NOT FILL NOW.  Please update patient's profile to reflect additional refills.  ####    Acne vulgaris       CRANBERRY CONCENTRATE/VITAMINC 140-100 MG Caps   Generic drug:  Cranberry-Vitamin C           escitalopram 20 MG tablet    LEXAPRO    90 tablet    Take 1 tablet (20 mg) by mouth daily    Anxiety       fluticasone 50 MCG/ACT spray    FLONASE    16 mL    PLACE 1 TO 2 SPRAYS INTO BOTH NOSTRILS DAILY AS NEEDED    Chronic rhinitis, unspecified type       SANDHYA 3-0.03 MG per tablet   Generic drug:  drospirenone-ethinyl estradiol     84 tablet    TAKE 1 TABLET BY MOUTH DAILY    Acne, unspecified acne type

## 2018-10-02 NOTE — PATIENT INSTRUCTIONS
Sore Throat              What is a sore throat?   Sore throat is a common symptom that ranges in severity from just a sense of scratchiness to severe pain.   Pharyngitis is the medical term for sore throat.   How does it occur?   Sore throat is caused by inflammation of the throat (pharynx). The pharynx is the area behind the tonsils. A sore throat may be the first symptom of usually mild illnesses such as a cold or the flu or of more severe illnesses such as mononucleosis or scarlet fever.   A sore throat that comes on suddenly is called acute pharyngitis. It can be caused by bacteria or viruses. A sore throat that lasts for a long time is called chronic pharyngitis. It occurs when a respiratory, sinus, or mouth infection spreads to the throat.   Other causes of sore throats include:   hay fever   cigarette smoking or secondhand smoke   breathing heavily polluted air or chemical fumes   swallowing sharp foods that hurt the lining of the throat, such as a tortilla chip   dry air   heartburn (gastric reflux)   postnasal drip from draining sinuses.   What are the symptoms?   Symptoms may include:   a raw feeling in the throat that makes breathing, swallowing, and speaking painful   redness of the throat   fever   hoarseness   pus in your throat   tender, swollen glands (lymph nodes) in your neck   earache (you may feel pain in your ears even though the problem is in your throat).   How is it diagnosed?   Your healthcare provider will ask about your recent medical history and your symptoms and examine your throat. Your provider also will examine you for signs of other illness, such as sinus, chest, or ear infections.   Just by looking at your throat, it is often hard for your healthcare provider to decide whether a virus or bacteria are causing your sore throat. Your provider may swab your throat to test for strep infection.   How is it treated?   Usually no specific medical treatment is needed if a virus is  causing the sore throat. The throat most often gets better on its own within 5 to 7 days. Antibiotic medicine does not cure viral pharyngitis.   For acute pharyngitis caused by bacteria, your healthcare provider may prescribe an antibiotic.   For chronic pharyngitis, your provider will look for other causes, such as allergies.   How long will the effects last?   Viral pharyngitis often goes away in 5 to 7 days.   If you have bacterial pharyngitis, you will feel better after you have taken antibiotics for 2 to 3 days. You must, though, take all of your antibiotic even when you are feeling better. If you don't take all of it, your sore throat could come back.   How can I take care of myself?   Do not smoke.   Avoid secondhand smoke and other air pollutants.   Use a cool mist humidifier to add moisture to the air.   Get plenty of rest.   You may want to rest your throat by talking less and eating a diet that is mostly liquid or soft for a day or two. Avoid salty or spicy foods and citrus fruits.   Nonprescription throat lozenges and mouthwashes should help relieve the soreness.   Gargling with warm saltwater and drinking warm liquids may help. (You can make a saltwater solution by adding 1/4 teaspoon of salt to 8 ounces, or 240 mL, of warm water.)   A nonprescription pain reliever such as aspirin, acetaminophen, or ibuprofen may ease general aches and pains. Check with your healthcare provider before you give any medicine that contains aspirin or salicylates to a child or teen. This includes medicines like baby aspirin, some cold medicines, and Pepto Bismol. Children and teens who take aspirin are at risk for a serious illness called Reye's syndrome.   If your sore throat lasts for more than a few days, call your healthcare provider.   How can I prevent a sore throat?   The following suggestions may help prevent a sore throat:   Don't share eating and drinking utensils with others.   Wash your hands often.   Don't let  your nose or mouth touch public telephones or drinking fountains.   Avoid close contact with other people who have a sore throat.   Stay indoors as much as possible on high-pollution days.   Don't stay in areas where there is heavy smoke from cigarettes.   Use a humidifier in your home if the air is quite dry.               Published by [employer].  This content is reviewed periodically and is subject to change as new health information becomes available. The information is intended to inform and educate and is not a replacement for medical evaluation, advice, diagnosis or treatment by a healthcare professional.   Developed by [employer].   ? 2010 [facility] and/or its affiliates. All Rights Reserved.   Copyright   Clinical Reference Systems 2011

## 2018-10-03 LAB
BACTERIA SPEC CULT: NORMAL
SPECIMEN SOURCE: NORMAL

## 2018-10-03 ASSESSMENT — ANXIETY QUESTIONNAIRES: GAD7 TOTAL SCORE: 1

## 2018-10-03 ASSESSMENT — PATIENT HEALTH QUESTIONNAIRE - PHQ9: SUM OF ALL RESPONSES TO PHQ QUESTIONS 1-9: 1

## 2018-10-30 DIAGNOSIS — F41.9 ANXIETY: ICD-10-CM

## 2018-10-30 RX ORDER — ESCITALOPRAM OXALATE 20 MG/1
TABLET ORAL
Qty: 90 TABLET | Refills: 0 | OUTPATIENT
Start: 2018-10-30

## 2018-10-30 NOTE — TELEPHONE ENCOUNTER
"Requested Prescriptions   Pending Prescriptions Disp Refills     escitalopram (LEXAPRO) 20 MG tablet [Pharmacy Med Name: ESCITALOPRAM 20MG TABLETS] 90 tablet 0     Sig: TAKE 1 TABLET BY MOUTH DAILY    SSRIs Protocol Passed    10/30/2018  8:20 AM       Passed - Recent (12 mo) or future (30 days) visit within the authorizing provider's specialty    Patient had office visit in the last 12 months or has a visit in the next 30 days with authorizing provider or within the authorizing provider's specialty.  See \"Patient Info\" tab in inbasket, or \"Choose Columns\" in Meds & Orders section of the refill encounter.             Passed - Patient is age 18 or older       Passed - No active pregnancy on record       Passed - No positive pregnancy test in last 12 months        escitalopram (LEXAPRO) 20 MG tablet  Rx was sent 10/02/2018 for 90 tabs and 3 refills.   Pharmacy notified via E-prescribe refusal.  Laurie Dougherty, RN, BSN       "

## 2019-02-26 ENCOUNTER — TELEPHONE (OUTPATIENT)
Dept: FAMILY MEDICINE | Facility: OTHER | Age: 29
End: 2019-02-26

## 2019-02-26 NOTE — TELEPHONE ENCOUNTER
Summary:    Patient is due/failing the following:   PAP    Action needed:   Patient needs office visit for PAP.    Type of outreach:    Phone, left message for patient to call back.     Questions for provider review:    None                                                                                                                                    Daniela Hernandez       Chart routed to Care Team .          Panel Management Review      Patient has the following on her problem list: None      Composite cancer screening  Chart review shows that this patient is due/due soon for the following Pap Smear

## 2019-03-04 ENCOUNTER — OFFICE VISIT (OUTPATIENT)
Dept: FAMILY MEDICINE | Facility: OTHER | Age: 29
End: 2019-03-04
Payer: COMMERCIAL

## 2019-03-04 VITALS
DIASTOLIC BLOOD PRESSURE: 84 MMHG | TEMPERATURE: 98.3 F | SYSTOLIC BLOOD PRESSURE: 112 MMHG | BODY MASS INDEX: 29.87 KG/M2 | OXYGEN SATURATION: 97 % | RESPIRATION RATE: 16 BRPM | WEIGHT: 174 LBS | HEART RATE: 98 BPM

## 2019-03-04 DIAGNOSIS — J18.9 PNEUMONIA OF RIGHT LOWER LOBE DUE TO INFECTIOUS ORGANISM: Primary | ICD-10-CM

## 2019-03-04 PROCEDURE — 99213 OFFICE O/P EST LOW 20 MIN: CPT | Performed by: FAMILY MEDICINE

## 2019-03-04 ASSESSMENT — PAIN SCALES - GENERAL: PAINLEVEL: SEVERE PAIN (7)

## 2019-03-05 NOTE — PROGRESS NOTES
SUBJECTIVE:   Aniyah Evans is a 28 year old female who presents to clinic today for the following health issues:    HPI  Acute Illness   Acute illness concerns: sore throat  Onset: a week    Fever: no    Chills/Sweats: no    Headache (location?): no    Sinus Pressure:YES    Conjunctivitis:  no    Ear Pain: YES: bilateral    Rhinorrhea: YES    Congestion: YES    Sore Throat: YES     Cough: YES-productive of clear sputum, with shortness of breath    Wheeze: YES     Decreased Appetite: YES    Nausea: no    Vomiting: no    Diarrhea:  no    Dysuria/Freq.: no    Fatigue/Achiness: YES    Sick/Strep Exposure: no, but goes to work and doesn't know if she caught anything.      Therapies Tried and outcome: tylenol, dayquil/nyquil    Problem list and histories reviewed & adjusted, as indicated.  Additional history: as documented    Patient Active Problem List   Diagnosis     Chronic rhinitis     Anxiety     Acne vulgaris     Encounter for surveillance of contraceptive pills     History reviewed. No pertinent surgical history.    Social History     Tobacco Use     Smoking status: Never Smoker     Smokeless tobacco: Never Used     Tobacco comment: no smokers in household   Substance Use Topics     Alcohol use: Yes     Comment: occ     Family History   Problem Relation Age of Onset     Lipids Mother      Gynecology Mother         cysts     Lipids Maternal Grandfather      Cardiovascular Maternal Grandfather      Gastrointestinal Disease Maternal Grandfather         esophageal problem     Genitourinary Problems Maternal Grandfather         kidney concerns     Heart Disease Maternal Grandfather      Diabetes Maternal Grandfather      Hypertension Maternal Grandfather      Cerebrovascular Disease Maternal Grandfather      Myocardial Infarction Maternal Grandfather      Eye Disorder Maternal Grandmother      Lipids Maternal Grandmother      Diabetes Maternal Grandmother      Hypertension Maternal Grandmother      Crohn's Disease  Maternal Grandmother      Liver Cancer Maternal Grandmother      Dementia Maternal Grandmother         brought on by ammonia     Thyroid Disease Paternal Grandmother         graves disease     Colon Cancer Paternal Grandmother      Unknown/Adopted Paternal Grandfather      Diabetes Maternal Aunt      Cancer Maternal Aunt         lymph node cancer     Diabetes Maternal Uncle      Cardiovascular Paternal Uncle         heart attack -  60's     Congenital Anomalies Paternal Uncle         cerebal palsy     Colon Cancer Paternal Uncle      C.A.D. No family hx of      Arthritis No family hx of      Alzheimer Disease No family hx of      Circulatory No family hx of      Musculoskeletal Disorder No family hx of      Neurologic Disorder No family hx of      Respiratory No family hx of      Blood Disease No family hx of      Depression No family hx of      Genetic Disorder No family hx of      Psychotic Disorder No family hx of      Breast Cancer No family hx of      Cancer - colorectal No family hx of      Prostate Cancer No family hx of      Anesthesia Reaction No family hx of      Asthma No family hx of      Osteoporosis No family hx of      Obesity No family hx of            ROS:  Constitutional, HEENT, cardiovascular, pulmonary, GI, , musculoskeletal, neuro, skin, endocrine and psych systems are negative, except as in HPI or otherwise noted.     This document serves as a record of the services and decisions personally performed and made by Lena Jean MD. It was created on her behalf by Loki Watts, a trained medical scribe. The creation of this document is based the provider's statements to the medical scribe.  Loki Watts, March 4, 2019 6:53 PM    OBJECTIVE:                                                    /84   Pulse 98   Temp 98.3  F (36.8  C) (Temporal)   Resp 16   Wt 78.9 kg (174 lb)   LMP 02/13/2019 (Approximate)   SpO2 97%   BMI 29.87 kg/m    Body mass index is 29.87 kg/m .   GENERAL:  healthy, alert, well nourished, well hydrated, no distress, overweight  HENT: ear canals- normal; TMs- normal; Nose- normal; Mouth- no ulcers, no lesions  NECK: no tenderness, no adenopathy, no asymmetry, no masses, no stiffness; thyroid- normal to palpation  RESP: Crackles on left lung base  SKIN: no suspicious lesions, no rashes to visible skin  PSYCH: Alert and oriented times 3; speech- coherent , normal rate and volume; able to articulate logical thoughts, able to abstract reason, no tangential thoughts, no hallucinations or delusions, affect- normal    Diagnostic test results:  No results found for this or any previous visit (from the past 24 hour(s)).     ASSESSMENT/PLAN:                                                        ICD-10-CM    1. Pneumonia of right lower lobe due to infectious organism (H) J18.1 amoxicillin-clavulanate (AUGMENTIN) 875-125 MG tablet     Pneumonia:  Offered chest x-ray, which she declines at this time. Will only need if she is not improving by end of antibiotics or is worsening at any time. Prescription given for Augmentin today.     The information in this document, created by the medical scribe for me, accurately reflects the services I personally performed and the decisions made by me. I have reviewed and approved this document for accuracy.   MD Lena Chambers MD, MD  [facility]

## 2019-03-05 NOTE — PATIENT INSTRUCTIONS
Avoid cough suppressants during the day to help clear out your lungs. Feel free to use at night to aid in sleep.

## 2019-04-19 ENCOUNTER — HEALTH MAINTENANCE LETTER (OUTPATIENT)
Age: 29
End: 2019-04-19
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
## 2019-06-25 ENCOUNTER — OFFICE VISIT (OUTPATIENT)
Dept: FAMILY MEDICINE | Facility: OTHER | Age: 29
End: 2019-06-25
Payer: COMMERCIAL

## 2019-06-25 VITALS
WEIGHT: 182 LBS | HEIGHT: 65 IN | RESPIRATION RATE: 16 BRPM | TEMPERATURE: 97.5 F | SYSTOLIC BLOOD PRESSURE: 108 MMHG | OXYGEN SATURATION: 97 % | HEART RATE: 95 BPM | DIASTOLIC BLOOD PRESSURE: 66 MMHG | BODY MASS INDEX: 30.32 KG/M2

## 2019-06-25 DIAGNOSIS — Z11.4 SCREENING FOR HIV (HUMAN IMMUNODEFICIENCY VIRUS): ICD-10-CM

## 2019-06-25 DIAGNOSIS — Z12.4 SCREENING FOR MALIGNANT NEOPLASM OF CERVIX: ICD-10-CM

## 2019-06-25 DIAGNOSIS — Z00.00 ROUTINE GENERAL MEDICAL EXAMINATION AT A HEALTH CARE FACILITY: Primary | ICD-10-CM

## 2019-06-25 LAB
CHOLEST SERPL-MCNC: 220 MG/DL
HDLC SERPL-MCNC: 56 MG/DL
HIV 1+2 AB+HIV1 P24 AG SERPL QL IA: NONREACTIVE
LDLC SERPL CALC-MCNC: 122 MG/DL
NONHDLC SERPL-MCNC: 164 MG/DL
TRIGL SERPL-MCNC: 210 MG/DL

## 2019-06-25 PROCEDURE — 99395 PREV VISIT EST AGE 18-39: CPT | Performed by: FAMILY MEDICINE

## 2019-06-25 PROCEDURE — 87389 HIV-1 AG W/HIV-1&-2 AB AG IA: CPT | Performed by: FAMILY MEDICINE

## 2019-06-25 PROCEDURE — 36415 COLL VENOUS BLD VENIPUNCTURE: CPT | Performed by: FAMILY MEDICINE

## 2019-06-25 PROCEDURE — G0145 SCR C/V CYTO,THINLAYER,RESCR: HCPCS | Performed by: FAMILY MEDICINE

## 2019-06-25 PROCEDURE — 80061 LIPID PANEL: CPT | Performed by: FAMILY MEDICINE

## 2019-06-25 ASSESSMENT — ENCOUNTER SYMPTOMS
FREQUENCY: 0
DIARRHEA: 0
PALPITATIONS: 0
HEMATURIA: 0
ABDOMINAL PAIN: 0
CONSTIPATION: 0
MYALGIAS: 0
NAUSEA: 0
HEMATOCHEZIA: 0
HEADACHES: 0
NERVOUS/ANXIOUS: 0
COUGH: 0
JOINT SWELLING: 0
DIZZINESS: 0
DYSURIA: 0
BREAST MASS: 0
SORE THROAT: 0
EYE PAIN: 0
PARESTHESIAS: 0
WEAKNESS: 0
HEARTBURN: 0
CHILLS: 0
ARTHRALGIAS: 0
FEVER: 0
SHORTNESS OF BREATH: 0

## 2019-06-25 ASSESSMENT — MIFFLIN-ST. JEOR: SCORE: 1556.43

## 2019-06-27 LAB
COPATH REPORT: NORMAL
PAP: NORMAL

## 2019-08-30 ENCOUNTER — NURSE TRIAGE (OUTPATIENT)
Dept: NURSING | Facility: CLINIC | Age: 29
End: 2019-08-30

## 2019-08-30 ENCOUNTER — MYC REFILL (OUTPATIENT)
Dept: FAMILY MEDICINE | Facility: OTHER | Age: 29
End: 2019-08-30

## 2019-08-30 ENCOUNTER — TELEPHONE (OUTPATIENT)
Dept: FAMILY MEDICINE | Facility: OTHER | Age: 29
End: 2019-08-30

## 2019-08-30 DIAGNOSIS — Z30.41 ENCOUNTER FOR SURVEILLANCE OF CONTRACEPTIVE PILLS: ICD-10-CM

## 2019-08-30 RX ORDER — DROSPIRENONE AND ETHINYL ESTRADIOL 0.03MG-3MG
1 KIT ORAL DAILY
Qty: 84 TABLET | Refills: 2 | Status: SHIPPED | OUTPATIENT
Start: 2019-08-30 | End: 2020-08-28

## 2019-08-30 RX ORDER — DROSPIRENONE AND ETHINYL ESTRADIOL 0.03MG-3MG
1 KIT ORAL DAILY
Qty: 84 TABLET | Refills: 3 | Status: CANCELLED | OUTPATIENT
Start: 2019-08-30

## 2019-08-30 NOTE — TELEPHONE ENCOUNTER
Aniyah is calling and is requesting a refill for birth control [facility] today as she is going out of town.  Aniyah can be reached at [phone].

## 2019-08-30 NOTE — TELEPHONE ENCOUNTER
BCP  Prescription approved per [facility] Refill Protocol.    Alka Morales, RN, BSN

## 2019-08-30 NOTE — TELEPHONE ENCOUNTER
Clinic Action Needed:  Yes, refill  FNA Triage Call  Presenting Problem:    Aniyah is calling and is requesting a refill for birth control Liz today as she is going out of town.  Aniyah can be reached at [phone].      Routed to:  RN Pool    Please be sure to close this encounter once this patient's issue/question has been addressed.    Lidia Marlow RN/[facility]

## 2019-08-30 NOTE — TELEPHONE ENCOUNTER
"Per other encounter: \"Clinic Action Needed:  Yes, refill  FNA Triage Call  Presenting Problem:     Aniyah is calling and is requesting a refill for birth control Liz today as she is going out of town.  Aniyah can be reached at [phone].       Routed to:  RN Pool     Please be sure to close this encounter once this patient's issue/question has been addressed.     Lidia Marlow RN/[facility]\"  "

## 2019-09-07 DIAGNOSIS — Z30.41 ENCOUNTER FOR SURVEILLANCE OF CONTRACEPTIVE PILLS: ICD-10-CM

## 2019-09-09 RX ORDER — DROSPIRENONE AND ETHINYL ESTRADIOL 0.03MG-3MG
KIT ORAL
Qty: 84 TABLET | Refills: 0 | OUTPATIENT
Start: 2019-09-09

## 2019-09-09 NOTE — TELEPHONE ENCOUNTER
BCP  Sent 8/30/19 with 9 month supply. Refill not appropriate at this time.     Alka Morales, RN, BSN

## 2019-09-12 ENCOUNTER — MYC REFILL (OUTPATIENT)
Dept: FAMILY MEDICINE | Facility: OTHER | Age: 29
End: 2019-09-12

## 2019-09-12 DIAGNOSIS — Z30.41 ENCOUNTER FOR SURVEILLANCE OF CONTRACEPTIVE PILLS: ICD-10-CM

## 2019-09-13 RX ORDER — DROSPIRENONE AND ETHINYL ESTRADIOL 0.03MG-3MG
1 KIT ORAL DAILY
Qty: 84 TABLET | Refills: 2
Start: 2019-09-13

## 2019-09-13 NOTE — TELEPHONE ENCOUNTER
Pending Prescriptions:                       Disp   Refills    drospirenone-ethinyl estradiol (SANDHYA) 3-*84 tab*2            Sig: Take 1 tablet by mouth daily    Sent 8/30/19 with 84 tablet, 2 refill supply. Refill not appropriate at this time.       Deyanira Velasquez, RN, BSN

## 2019-09-27 ENCOUNTER — HEALTH MAINTENANCE LETTER (OUTPATIENT)
Age: 29
End: 2019-09-27

## 2019-10-15 DIAGNOSIS — J31.0 CHRONIC RHINITIS: ICD-10-CM

## 2019-10-15 DIAGNOSIS — F41.9 ANXIETY: ICD-10-CM

## 2019-10-16 ENCOUNTER — MYC REFILL (OUTPATIENT)
Dept: FAMILY MEDICINE | Facility: OTHER | Age: 29
End: 2019-10-16

## 2019-10-16 DIAGNOSIS — J31.0 CHRONIC RHINITIS: ICD-10-CM

## 2019-10-16 DIAGNOSIS — F41.9 ANXIETY: ICD-10-CM

## 2019-10-16 RX ORDER — FLUTICASONE PROPIONATE 50 MCG
SPRAY, SUSPENSION (ML) NASAL
Qty: 16 ML | Refills: 1 | Status: CANCELLED | OUTPATIENT
Start: 2019-10-16

## 2019-10-16 RX ORDER — FLUTICASONE PROPIONATE 50 MCG
SPRAY, SUSPENSION (ML) NASAL
Qty: 16 ML | Refills: 1 | Status: SHIPPED | OUTPATIENT
Start: 2019-10-16 | End: 2020-05-14

## 2019-10-16 RX ORDER — ESCITALOPRAM OXALATE 20 MG/1
20 TABLET ORAL DAILY
Qty: 90 TABLET | Refills: 0 | Status: SHIPPED | OUTPATIENT
Start: 2019-10-16 | End: 2020-02-03

## 2019-10-16 RX ORDER — ESCITALOPRAM OXALATE 20 MG/1
20 TABLET ORAL DAILY
Qty: 90 TABLET | Refills: 0 | Status: CANCELLED | OUTPATIENT
Start: 2019-10-16

## 2019-10-16 NOTE — TELEPHONE ENCOUNTER
Prescription approved per [facility] [place]ill Protocol.  Brennen Mayorga, RN, BSN

## 2020-05-13 DIAGNOSIS — J31.0 CHRONIC RHINITIS: ICD-10-CM

## 2020-05-14 ENCOUNTER — MYC MEDICAL ADVICE (OUTPATIENT)
Dept: FAMILY MEDICINE | Facility: OTHER | Age: 30
End: 2020-05-14

## 2020-05-14 DIAGNOSIS — Z30.41 ENCOUNTER FOR SURVEILLANCE OF CONTRACEPTIVE PILLS: ICD-10-CM

## 2020-05-14 RX ORDER — DROSPIRENONE AND ETHINYL ESTRADIOL 0.03MG-3MG
1 KIT ORAL DAILY
Qty: 84 TABLET | Refills: 0 | Status: SHIPPED | OUTPATIENT
Start: 2020-05-14 | End: 2020-08-04

## 2020-05-14 RX ORDER — FLUTICASONE PROPIONATE 50 MCG
SPRAY, SUSPENSION (ML) NASAL
Qty: 16 G | Refills: 0 | Status: SHIPPED | OUTPATIENT
Start: 2020-05-14 | End: 2020-08-21

## 2020-05-14 RX ORDER — DROSPIRENONE AND ETHINYL ESTRADIOL 0.03MG-3MG
1 KIT ORAL DAILY
Qty: 84 TABLET | Refills: 2 | Status: CANCELLED | OUTPATIENT
Start: 2020-05-14

## 2020-05-14 NOTE — TELEPHONE ENCOUNTER
Pending Prescriptions:                       Disp   Refills    fluticasone (FLONASE) 50 MCG/ACT nasal sp*16 g   0            Sig: SHAKE LIQUID AND USE 1 TO 2 SPRAYS IN EACH           NOSTRIL DAILY AS NEEDED    Prescription approved per [facility] Refill Protocol.  Due for annual office visit 6/2020.    Deyanira Velasquez, BSN, RN, PHN

## 2020-05-14 NOTE — TELEPHONE ENCOUNTER
appt scheduled   Next 5 appointments (look out 90 days)    Jul 10, 2020 11:40 AM CDT  PHYSICAL with Jaycee Schultz MD  [facility] ([facility]) [address] [address]  [phone]        Closing encounter  Kirstie Hall RT (R)

## 2020-07-28 ASSESSMENT — ENCOUNTER SYMPTOMS
ARTHRALGIAS: 0
BREAST MASS: 0
PARESTHESIAS: 0
WEAKNESS: 0
DIZZINESS: 0
FREQUENCY: 0
CHILLS: 0
SORE THROAT: 0
ABDOMINAL PAIN: 0
HEARTBURN: 0
MYALGIAS: 0
JOINT SWELLING: 0
HEMATURIA: 0
DYSURIA: 0
HEADACHES: 1
PALPITATIONS: 0
HEMATOCHEZIA: 0
NAUSEA: 0
COUGH: 0
SHORTNESS OF BREATH: 0
FEVER: 0
NERVOUS/ANXIOUS: 1
DIARRHEA: 0
EYE PAIN: 0
CONSTIPATION: 0

## 2020-07-28 NOTE — PROGRESS NOTES
SUBJECTIVE:   CC: Aniyah Evans is an 30 year old woman who presents for preventive health visit.     Healthy Habits:     Getting at least 3 servings of Calcium per day:  Yes    Bi-annual eye exam:  Yes    Dental care twice a year:  Yes    Sleep apnea or symptoms of sleep apnea:  None    Diet:  Regular (no restrictions)    Frequency of exercise:  1 day/week    Duration of exercise:  30-45 minutes    Taking medications regularly:  Yes    Medication side effects:  None    PHQ-2 Total Score: 1    Additional concerns today:  No    Today's PHQ-2 Score:   PHQ-2 ( 1999 Pfizer) 7/24/2020   Q1: Little interest or pleasure in doing things 0   Q2: Feeling down, depressed or hopeless 1   PHQ-2 Score 1   Q1: Little interest or pleasure in doing things Not at all   Q2: Feeling down, depressed or hopeless Several days   PHQ-2 Score 1     Abuse: Current or Past(Physical, Sexual or Emotional)- No  Do you feel safe in your environment? Yes    Social History     Tobacco Use     Smoking status: Never Smoker     Smokeless tobacco: Never Used     Tobacco comment: no smokers in household   Substance Use Topics     Alcohol use: Yes     Comment: occ     Alcohol Use 7/24/2020   Prescreen: >3 drinks/day or >7 drinks/week? No   Prescreen: >3 drinks/day or >7 drinks/week? -     Reviewed orders with patient.  Reviewed health maintenance and updated orders accordingly - Yes    Pertinent mammograms are reviewed under the imaging tab.  History of abnormal Pap smear: NO - age 21-29 PAP every 3 years recommended  PAP / HPV 6/25/2019 1/29/2016 12/10/2012   PAP NIL NIL NIL     Reviewed and updated as needed this visit by clinical staff  Tobacco  Allergies  Meds  Problems  Med Hx  Surg Hx  Fam Hx  Soc Hx          Reviewed and updated as needed this visit by Provider  Tobacco  Allergies  Meds  Problems  Med Hx  Surg Hx  Fam Hx            Review of Systems   Constitutional: Negative for chills and fever.   HENT: Negative for  congestion, ear pain, hearing loss and sore throat.    Eyes: Negative for pain and visual disturbance.   Respiratory: Negative for cough and shortness of breath.    Cardiovascular: Negative for chest pain, palpitations and peripheral edema.   Gastrointestinal: Negative for abdominal pain, constipation, diarrhea, heartburn, hematochezia and nausea.   Breasts:  Positive for tenderness. Negative for breast mass and discharge.   Genitourinary: Positive for vaginal discharge. Negative for dysuria, frequency, genital sores, hematuria, pelvic pain, urgency and vaginal bleeding.   Musculoskeletal: Negative for arthralgias, joint swelling and myalgias.   Skin: Negative for rash.   Neurological: Positive for headaches. Negative for dizziness, weakness and paresthesias.   Psychiatric/Behavioral: Negative for mood changes. The patient is nervous/anxious.         OBJECTIVE:   /70   Pulse 72   Temp 98.4  F (36.9  C) (Temporal)   Resp 12   Wt 85.7 kg (189 lb)   LMP 08/04/2020 (Exact Date)   SpO2 98%   BMI 31.45 kg/m    Physical Exam  Constitutional:       General: She is not in acute distress.     Appearance: She is well-developed.   HENT:      Right Ear: Tympanic membrane and external ear normal.      Left Ear: Tympanic membrane and external ear normal.      Nose: Nose normal.      Mouth/Throat:      Pharynx: No oropharyngeal exudate.   Eyes:      General:         Right eye: No discharge.         Left eye: No discharge.      Conjunctiva/sclera: Conjunctivae normal.      Pupils: Pupils are equal, round, and reactive to light.   Neck:      Musculoskeletal: Neck supple.      Thyroid: No thyromegaly.      Trachea: No tracheal deviation.   Cardiovascular:      Rate and Rhythm: Normal rate and regular rhythm.      Pulses: Normal pulses.      Heart sounds: Normal heart sounds, S1 normal and S2 normal. No murmur. No friction rub. No S3 or S4 sounds.    Pulmonary:      Effort: Pulmonary effort is normal. No respiratory  "distress.      Breath sounds: Normal breath sounds. No wheezing or rales.   Abdominal:      General: Bowel sounds are normal.      Palpations: Abdomen is soft. There is no mass.      Tenderness: There is no abdominal tenderness.   Musculoskeletal: Normal range of motion.   Lymphadenopathy:      Cervical: No cervical adenopathy.   Skin:     General: Skin is warm and dry.      Findings: No rash.   Neurological:      Mental Status: She is alert and oriented to person, place, and time.      Motor: No abnormal muscle tone.      Deep Tendon Reflexes: Reflexes are normal and symmetric.   Psychiatric:         Thought Content: Thought content normal.         Judgment: Judgment normal.               ASSESSMENT/PLAN:   1. Routine general medical examination at a health care facility      2. Encounter for surveillance of contraceptive pills  Feels her oral contraceptive pills are working well.  Refills are given.    - drospirenone-ethinyl estradiol (SANDHYA) 3-0.03 MG tablet; Take 1 tablet by mouth daily  Dispense: 84 tablet; Refill: 4    3. Anxiety  Patient just started CBD oil and she wants to see if this helps with her anxiety before considering changing her medications.    - escitalopram (LEXAPRO) 20 MG tablet; Take 1 tablet (20 mg) by mouth daily  Dispense: 90 tablet; Refill: 3    COUNSELING:  Reviewed preventive health counseling, as reflected in patient instructions    Estimated body mass index is 31.45 kg/m  as calculated from the following:    Height as of 6/25/19: 1.651 m (5' 5\").    Weight as of this encounter: 85.7 kg (189 lb).    Weight management plan: Discussed healthy diet and exercise guidelines     reports that she has never smoked. She has never used smokeless tobacco.      Counseling Resources:  ATP IV Guidelines  Pooled Cohorts Equation Calculator  Breast Cancer Risk Calculator  FRAX Risk Assessment  ICSI Preventive Guidelines  Dietary Guidelines for Americans, 2010  USDA's MyPlate  ASA Prophylaxis  Lung CA " Screening    Jaycee Schultz MD  [facility]

## 2020-08-03 DIAGNOSIS — Z30.41 ENCOUNTER FOR SURVEILLANCE OF CONTRACEPTIVE PILLS: ICD-10-CM

## 2020-08-04 RX ORDER — DROSPIRENONE AND ETHINYL ESTRADIOL 0.03MG-3MG
KIT ORAL
Qty: 84 TABLET | Refills: 0 | Status: SHIPPED | OUTPATIENT
Start: 2020-08-04 | End: 2020-08-28

## 2020-08-04 NOTE — TELEPHONE ENCOUNTER
Pending Prescriptions:                       Disp   Refills    VILMA 3-0.03 MG tablet [Pharmacy Med Name*84 tab*0            Sig: TAKE 1 TABLET BY MOUTH DAILY    Ruth refill    Next 5 appointments (look out 90 days)    Aug 28, 2020  3:40 PM CDT  PHYSICAL with [facility]ultz MD  [facility] [address] [facility] [address]  [facility] [id number] [phone]        Suyapa Calvert, MSN, RN

## 2020-08-21 DIAGNOSIS — J31.0 CHRONIC RHINITIS: ICD-10-CM

## 2020-08-21 RX ORDER — FLUTICASONE PROPIONATE 50 MCG
SPRAY, SUSPENSION (ML) NASAL
Qty: 16 G | Refills: 0 | Status: SHIPPED | OUTPATIENT
Start: 2020-08-21 | End: 2020-10-20

## 2020-08-28 ENCOUNTER — OFFICE VISIT (OUTPATIENT)
Dept: FAMILY MEDICINE | Facility: OTHER | Age: 30
End: 2020-08-28
Payer: COMMERCIAL

## 2020-08-28 VITALS
WEIGHT: 189 LBS | TEMPERATURE: 98.4 F | DIASTOLIC BLOOD PRESSURE: 70 MMHG | OXYGEN SATURATION: 98 % | SYSTOLIC BLOOD PRESSURE: 110 MMHG | BODY MASS INDEX: 31.45 KG/M2 | HEART RATE: 72 BPM | RESPIRATION RATE: 12 BRPM

## 2020-08-28 DIAGNOSIS — Z00.00 ROUTINE GENERAL MEDICAL EXAMINATION AT A HEALTH CARE FACILITY: Primary | ICD-10-CM

## 2020-08-28 DIAGNOSIS — Z30.41 ENCOUNTER FOR SURVEILLANCE OF CONTRACEPTIVE PILLS: ICD-10-CM

## 2020-08-28 DIAGNOSIS — F41.9 ANXIETY: ICD-10-CM

## 2020-08-28 PROCEDURE — 99395 PREV VISIT EST AGE 18-39: CPT | Performed by: FAMILY MEDICINE

## 2020-08-28 RX ORDER — ESCITALOPRAM OXALATE 20 MG/1
20 TABLET ORAL DAILY
Qty: 90 TABLET | Refills: 3 | Status: SHIPPED | OUTPATIENT
Start: 2020-08-28 | End: 2021-11-24

## 2020-08-28 RX ORDER — DROSPIRENONE AND ETHINYL ESTRADIOL 0.03MG-3MG
1 KIT ORAL DAILY
Qty: 84 TABLET | Refills: 4 | Status: SHIPPED | OUTPATIENT
Start: 2020-08-28 | End: 2021-09-27

## 2020-10-19 DIAGNOSIS — J31.0 CHRONIC RHINITIS: ICD-10-CM

## 2020-10-20 RX ORDER — FLUTICASONE PROPIONATE 50 MCG
SPRAY, SUSPENSION (ML) NASAL
Qty: 16 G | Refills: 1 | Status: SHIPPED | OUTPATIENT
Start: 2020-10-20 | End: 2021-05-26

## 2020-10-20 NOTE — TELEPHONE ENCOUNTER
Prescription approved per [facility] [place].  Beatriz Roger RN

## 2020-11-23 ENCOUNTER — MYC MEDICAL ADVICE (OUTPATIENT)
Dept: FAMILY MEDICINE | Facility: OTHER | Age: 30
End: 2020-11-23

## 2020-11-25 NOTE — PROGRESS NOTES
"Subjective     Aniyah Evans is a 30 year old female who presents to clinic today for the following health issues:    HPI         Concern - Lumps in Groin  Onset: 1 month   Description: Patient reports having painful lumps in left groin area, Under the skin,   Intensity: mild, moderate  Progression of Symptoms:  worsening  Accompanying Signs & Symptoms: Pain with pressure, Patient had an injury to this area about the time this started.  Previous history of similar problem: Yes    Precipitating factors:        Worsened by: Pressure  Alleviating factors:        Improved by: none  Therapies tried and outcome: ibuprofen and tylenol    Has not had this in the past.  No drainage.  No lumps under armpits or breasts. No hair to nipples or mid abdomen.  Maternal aunt with lymph cancer that was found but had cervical lymphadenopathy.  No fevers, sweats, weight loss.      Review of Systems   Constitutional, HEENT, cardiovascular, pulmonary, gi and gu systems are negative, except as otherwise noted.      Objective    /72   Pulse 96   Temp 98  F (36.7  C) (Temporal)   Resp 16   Ht 1.651 m (5' 5\")   Wt 89.2 kg (196 lb 12 oz)   LMP 11/19/2020   BMI 32.74 kg/m    Body mass index is 32.74 kg/m .  Physical Exam   GENERAL: healthy, alert and no distress  NECK: no adenopathy, no asymmetry, masses, or scars and thyroid normal to palpation  RESP: lungs clear to auscultation - no rales, rhonchi or wheezes  CV: regular rate and rhythm, normal S1 S2, no S3 or S4, no murmur, click or rub, no peripheral edema and peripheral pulses strong  ABDOMEN: soft, nontender, no hepatosplenomegaly, no masses and bowel sounds normal  Groin- patient with two pea sized tender, mobile lymph nodes.  No drainge, erythema, warmth.  No signs of surrounding infection.   MS: no gross musculoskeletal defects noted, no edema    No results found for this or any previous visit (from the past 24 hour(s)).        Assessment & Plan "     Lymphadenopathy  Treat with nsaids, warm packs.  Follow up in clinic if no improvement, worsening symptoms, or concerns.     Need for prophylactic vaccination and inoculation against influenza  Update today  - INFLUENZA VACCINE IM > 6 MONTHS VALENT IIV4 [87626]          Return in about 4 weeks (around 12/25/2020) for recheck with PCP.    Martina Bernal NP  [facility]

## 2020-11-27 ENCOUNTER — OFFICE VISIT (OUTPATIENT)
Dept: FAMILY MEDICINE | Facility: OTHER | Age: 30
End: 2020-11-27
Payer: COMMERCIAL

## 2020-11-27 VITALS
HEART RATE: 96 BPM | TEMPERATURE: 98 F | HEIGHT: 65 IN | SYSTOLIC BLOOD PRESSURE: 108 MMHG | BODY MASS INDEX: 32.78 KG/M2 | WEIGHT: 196.75 LBS | RESPIRATION RATE: 16 BRPM | DIASTOLIC BLOOD PRESSURE: 72 MMHG

## 2020-11-27 DIAGNOSIS — R59.1 LYMPHADENOPATHY: Primary | ICD-10-CM

## 2020-11-27 DIAGNOSIS — Z23 NEED FOR PROPHYLACTIC VACCINATION AND INOCULATION AGAINST INFLUENZA: ICD-10-CM

## 2020-11-27 PROCEDURE — 90471 IMMUNIZATION ADMIN: CPT | Performed by: NURSE PRACTITIONER

## 2020-11-27 PROCEDURE — 90686 IIV4 VACC NO PRSV 0.5 ML IM: CPT | Performed by: NURSE PRACTITIONER

## 2020-11-27 PROCEDURE — 99213 OFFICE O/P EST LOW 20 MIN: CPT | Mod: 25 | Performed by: NURSE PRACTITIONER

## 2020-11-27 ASSESSMENT — MIFFLIN-ST. JEOR: SCORE: 1618.33

## 2020-11-27 ASSESSMENT — PAIN SCALES - GENERAL: PAINLEVEL: MILD PAIN (2)

## 2020-11-27 NOTE — PATIENT INSTRUCTIONS
Take aleve- one in am and one in pm for 7-10 days- if not improving in 3-4 weeks let me know.    Patient Education     Lymphadenopathy  Lymphadenopathy is swelling of the lymph nodes. Lymph nodes are small, bean-shaped glands around the body.  What are lymph nodes?  Lymph nodes are part of your immune system. These glands are found in your neck, over your clavicle, armpits, groin, chest, and belly (abdomen). They act as filters for lymph fluid as it flows through your body. Lymph fluid contains white blood cells (lymphocytes) that help the body fight infection and disease.   Why lymph nodes swell  Lymphadenopathy is very common. The glands often get larger during a viral or bacterial infection. It can happen during a cold, the flu, or strep throat. The nodes may swell in just one area of the body, such as the neck (localized). Or nodes may swell all over the body (generalized). The neck (cervical) lymph nodes are the most common site of lymphadenopathy.  What causes lymphadenopathy?  Dead cells and fluid build up in the lymph nodes as they help fight infection or disease. This causes them to swell in size. Enlarged lymph nodes are often near the source of infection. This can help to find the cause of an infection. For example, swollen lymph nodes around the jaw may be because of an infection in the teeth or mouth. But lymphadenopathy may also be generalized. This is common in some viral illnesses such as infectious mononucleosis, HIV, or chickenpox (varicella).  Lymphadenopathy can also be caused by:    Infection of a lymph node or small group of nodes (lymphadenitis)    Cancer    Reactions to medicines such as antibiotics and certain blood pressure, gout, and seizure medicines    Other health conditions, such as lupus or sarcoidosis  Symptoms of lymphadenopathy  Lymphadenopathy can cause symptoms such as:    Lumps under the jaw, on the sides or back of the neck, in the armpits, in the groin, or in the chest or  belly (abdomen)    Pain or soreness in any of these areas    Redness or warmth in any of these areas  You may also have symptoms from an infection causing the swollen glands. These symptoms may include fever, sore throat, body aches, or cough.  Diagnosing lymphadenopathy  Your healthcare provider will ask about your health history and symptoms. He or she will give you a physical exam and check the areas where lymph nodes are enlarged. Your provider will check the size, texture, and location of the nodes. He or she will ask how long they have been swollen and if they are painful. You may be advised to have diagnostic tests and referral to specialists may be advised. The tests may include:    Blood tests. These are done to check for signs of infection and other problems.    Urine test. This is also done to check for infection and other problems.    Chest X-ray, ultrasound, CT scan, or MRI scans. These tests can show enlarged lymph nodes or other problems.    Lymph node biopsy. The cause of enlarged lymph nodes may be checked with a biopsy. Small samples of lymph node tissue are taken and checked in a lab for signs of infection, cancer, and other causes. You may be referred to other specialists for their opinion as well.  Treatment for lymphadenopathy  The treatment of enlarged lymph nodes depends on the cause. Enlarged lymph nodes are often harmless and go away without any treatment. Treatment is most often done on the cause of the enlarged nodes and may include:    Antibiotic or antiviral medicine to treat a bacterial or viral infection    Incision and drainage of a lymph node for lymphadenitis    Other medicines or procedures to treat the cause of the enlarged nodes  You may need a follow-up exam in 3 to 4 weeks to recheck enlarged nodes.  When to call your healthcare provider  Call your healthcare provider if:    Your symptoms get worse    You have new symptoms    You have a fever of 100.4 F (38 C) or higher, or as  directed by your provider    Your lymph nodes that are still swollen after 3 to 4 weeks    [employer]yr[employer] last reviewed this educational content on 6/1/2019 2000-2020 [employer]. [address], [place] [address]. All rights reserved. This information is not intended as a substitute for professional medical care. Always follow your healthcare professional's instructions.

## 2021-05-26 DIAGNOSIS — J31.0 CHRONIC RHINITIS: ICD-10-CM

## 2021-05-27 RX ORDER — FLUTICASONE PROPIONATE 50 MCG
SPRAY, SUSPENSION (ML) NASAL
Qty: 16 G | Refills: 0 | Status: SHIPPED | OUTPATIENT
Start: 2021-05-27 | End: 2021-08-25

## 2021-05-27 NOTE — TELEPHONE ENCOUNTER
Pending Prescriptions:                       Disp   Refills    fluticasone (FLONASE) 50 MCG/ACT nasal sp*16 g   0            Sig: SHAKE LIQUID AND USE 1 TO 2 SPRAYS IN EACH           NOSTRIL DAILY AS NEEDED    Medication is being filled for 1 time carey refill only due to:  Patient is due for medication follow up    Please call and help schedule.  Thank you!    Kirstie Shannon RN on 5/27/2021 at 4:02 PM

## 2021-08-23 DIAGNOSIS — J31.0 CHRONIC RHINITIS: ICD-10-CM

## 2021-08-25 RX ORDER — FLUTICASONE PROPIONATE 50 MCG
SPRAY, SUSPENSION (ML) NASAL
Qty: 16 G | Refills: 1 | Status: SHIPPED | OUTPATIENT
Start: 2021-08-25 | End: 2022-06-16

## 2021-08-25 NOTE — TELEPHONE ENCOUNTER
Prescription approved per [facility] Refill Protocol.    Kirstie Shannon RN on 8/25/2021 at 5:47 PM

## 2021-09-24 ENCOUNTER — MYC MEDICAL ADVICE (OUTPATIENT)
Dept: FAMILY MEDICINE | Facility: OTHER | Age: 31
End: 2021-09-24

## 2021-09-24 DIAGNOSIS — Z30.41 ENCOUNTER FOR SURVEILLANCE OF CONTRACEPTIVE PILLS: ICD-10-CM

## 2021-09-27 RX ORDER — DROSPIRENONE AND ETHINYL ESTRADIOL 0.03MG-3MG
1 KIT ORAL DAILY
Qty: 84 TABLET | Refills: 0 | Status: SHIPPED | OUTPATIENT
Start: 2021-09-27 | End: 2021-10-21

## 2021-10-19 ASSESSMENT — ENCOUNTER SYMPTOMS
ARTHRALGIAS: 0
HEMATURIA: 0
PARESTHESIAS: 0
FEVER: 0
CHILLS: 0
COUGH: 0
DIARRHEA: 0
EYE PAIN: 0
JOINT SWELLING: 0
SHORTNESS OF BREATH: 0
BREAST MASS: 0
NERVOUS/ANXIOUS: 1
HEMATOCHEZIA: 0
PALPITATIONS: 0
SORE THROAT: 0
CONSTIPATION: 0
DIZZINESS: 0
ABDOMINAL PAIN: 0
DYSURIA: 0
WEAKNESS: 0
HEADACHES: 1
HEARTBURN: 1
NAUSEA: 0
FREQUENCY: 0
MYALGIAS: 0

## 2021-10-21 ENCOUNTER — OFFICE VISIT (OUTPATIENT)
Dept: FAMILY MEDICINE | Facility: OTHER | Age: 31
End: 2021-10-21
Payer: COMMERCIAL

## 2021-10-21 VITALS
WEIGHT: 201 LBS | RESPIRATION RATE: 18 BRPM | SYSTOLIC BLOOD PRESSURE: 110 MMHG | HEART RATE: 74 BPM | TEMPERATURE: 97.5 F | DIASTOLIC BLOOD PRESSURE: 70 MMHG | HEIGHT: 65 IN | BODY MASS INDEX: 33.49 KG/M2 | OXYGEN SATURATION: 99 %

## 2021-10-21 DIAGNOSIS — Z13.228 SCREENING FOR METABOLIC DISORDER: ICD-10-CM

## 2021-10-21 DIAGNOSIS — Z11.59 NEED FOR HEPATITIS C SCREENING TEST: ICD-10-CM

## 2021-10-21 DIAGNOSIS — Z13.220 SCREENING FOR HYPERLIPIDEMIA: ICD-10-CM

## 2021-10-21 DIAGNOSIS — Z87.42 HISTORY OF OVARIAN CYST: ICD-10-CM

## 2021-10-21 DIAGNOSIS — Z00.00 ROUTINE GENERAL MEDICAL EXAMINATION AT A HEALTH CARE FACILITY: Primary | ICD-10-CM

## 2021-10-21 DIAGNOSIS — Z11.3 SCREEN FOR STD (SEXUALLY TRANSMITTED DISEASE): ICD-10-CM

## 2021-10-21 DIAGNOSIS — Z30.41 ENCOUNTER FOR SURVEILLANCE OF CONTRACEPTIVE PILLS: ICD-10-CM

## 2021-10-21 DIAGNOSIS — L70.0 ACNE VULGARIS: ICD-10-CM

## 2021-10-21 LAB
ALBUMIN SERPL-MCNC: 3.4 G/DL (ref 3.4–5)
ALP SERPL-CCNC: 71 U/L (ref 40–150)
ALT SERPL W P-5'-P-CCNC: 16 U/L (ref 0–50)
ANION GAP SERPL CALCULATED.3IONS-SCNC: 5 MMOL/L (ref 3–14)
AST SERPL W P-5'-P-CCNC: 10 U/L (ref 0–45)
BILIRUB SERPL-MCNC: 0.4 MG/DL (ref 0.2–1.3)
BUN SERPL-MCNC: 10 MG/DL (ref 7–30)
CALCIUM SERPL-MCNC: 8.5 MG/DL (ref 8.5–10.1)
CHLORIDE BLD-SCNC: 109 MMOL/L (ref 94–109)
CHOLEST SERPL-MCNC: 216 MG/DL
CLUE CELLS: ABNORMAL
CO2 SERPL-SCNC: 26 MMOL/L (ref 20–32)
CREAT SERPL-MCNC: 0.8 MG/DL (ref 0.52–1.04)
FASTING STATUS PATIENT QL REPORTED: YES
GFR SERPL CREATININE-BSD FRML MDRD: >90 ML/MIN/1.73M2
GLUCOSE BLD-MCNC: 104 MG/DL (ref 70–99)
HCV AB SERPL QL IA: NONREACTIVE
HDLC SERPL-MCNC: 51 MG/DL
HIV 1+2 AB+HIV1 P24 AG SERPL QL IA: NONREACTIVE
LDLC SERPL CALC-MCNC: 127 MG/DL
NONHDLC SERPL-MCNC: 165 MG/DL
POTASSIUM BLD-SCNC: 3.7 MMOL/L (ref 3.4–5.3)
PROT SERPL-MCNC: 7.5 G/DL (ref 6.8–8.8)
SODIUM SERPL-SCNC: 140 MMOL/L (ref 133–144)
T PALLIDUM AB SER QL: NONREACTIVE
TRICHOMONAS, WET PREP: ABNORMAL
TRIGL SERPL-MCNC: 190 MG/DL
WBC'S/HIGH POWER FIELD, WET PREP: ABNORMAL
YEAST, WET PREP: ABNORMAL

## 2021-10-21 PROCEDURE — 86780 TREPONEMA PALLIDUM: CPT | Performed by: NURSE PRACTITIONER

## 2021-10-21 PROCEDURE — 99395 PREV VISIT EST AGE 18-39: CPT | Performed by: NURSE PRACTITIONER

## 2021-10-21 PROCEDURE — 87491 CHLMYD TRACH DNA AMP PROBE: CPT | Performed by: NURSE PRACTITIONER

## 2021-10-21 PROCEDURE — 80053 COMPREHEN METABOLIC PANEL: CPT | Performed by: NURSE PRACTITIONER

## 2021-10-21 PROCEDURE — 99213 OFFICE O/P EST LOW 20 MIN: CPT | Mod: 25 | Performed by: NURSE PRACTITIONER

## 2021-10-21 PROCEDURE — 80061 LIPID PANEL: CPT | Performed by: NURSE PRACTITIONER

## 2021-10-21 PROCEDURE — 87591 N.GONORRHOEAE DNA AMP PROB: CPT | Performed by: NURSE PRACTITIONER

## 2021-10-21 PROCEDURE — 86803 HEPATITIS C AB TEST: CPT | Performed by: NURSE PRACTITIONER

## 2021-10-21 PROCEDURE — 87389 HIV-1 AG W/HIV-1&-2 AB AG IA: CPT | Performed by: NURSE PRACTITIONER

## 2021-10-21 PROCEDURE — 36415 COLL VENOUS BLD VENIPUNCTURE: CPT | Performed by: NURSE PRACTITIONER

## 2021-10-21 PROCEDURE — 87210 SMEAR WET MOUNT SALINE/INK: CPT | Performed by: NURSE PRACTITIONER

## 2021-10-21 RX ORDER — CLINDAMYCIN PHOSPHATE 11.9 MG/ML
SOLUTION TOPICAL 2 TIMES DAILY
Qty: 60 ML | Refills: 11 | Status: SHIPPED | OUTPATIENT
Start: 2021-10-21 | End: 2024-04-23

## 2021-10-21 RX ORDER — DROSPIRENONE AND ETHINYL ESTRADIOL 0.03MG-3MG
1 KIT ORAL DAILY
Qty: 84 TABLET | Refills: 3 | Status: SHIPPED | OUTPATIENT
Start: 2021-10-21 | End: 2021-11-26

## 2021-10-21 ASSESSMENT — PAIN SCALES - GENERAL: PAINLEVEL: NO PAIN (0)

## 2021-10-21 ASSESSMENT — ENCOUNTER SYMPTOMS
COUGH: 0
NAUSEA: 0
DYSURIA: 0
FEVER: 0
PALPITATIONS: 0
ABDOMINAL PAIN: 0
BREAST MASS: 0
SORE THROAT: 0
CONSTIPATION: 0
EYE PAIN: 0
PARESTHESIAS: 0
DIZZINESS: 0
WEAKNESS: 0
CHILLS: 0
FREQUENCY: 0
JOINT SWELLING: 0
MYALGIAS: 0
ARTHRALGIAS: 0
DIARRHEA: 0
NERVOUS/ANXIOUS: 1
HEMATURIA: 0
HEADACHES: 1
HEMATOCHEZIA: 0
SHORTNESS OF BREATH: 0
HEARTBURN: 1

## 2021-10-21 ASSESSMENT — MIFFLIN-ST. JEOR: SCORE: 1631.99

## 2021-10-21 NOTE — PATIENT INSTRUCTIONS
Preventive Health Recommendations  Female Ages 26 - 39  Yearly exam:   See your health care provider every year in order to    Review health changes.     Discuss preventive care.      Review your medicines if you your doctor has prescribed any.    Until age 30: Get a Pap test every three years (more often if you have had an abnormal result).    After age 30: Talk to your doctor about whether you should have a Pap test every 3 years or have a Pap test with HPV screening every 5 years.   You do not need a Pap test if your uterus was removed (hysterectomy) and you have not had cancer.  You should be tested each year for STDs (sexually transmitted diseases), if you're at risk.   Talk to your provider about how often to have your cholesterol checked.  If you are at risk for diabetes, you should have a diabetes test (fasting glucose).  Shots: Get a flu shot each year. Get a tetanus shot every 10 years.   Nutrition:     Eat at least 5 servings of fruits and vegetables each day.    Eat whole-grain bread, whole-wheat pasta and brown rice instead of white grains and rice.    Get adequate Calcium and Vitamin D.     Lifestyle    Exercise at least 150 minutes a week (30 minutes a day, 5 days of the week). This will help you control your weight and prevent disease.    Limit alcohol to one drink per day.    No smoking.     Wear sunscreen to prevent skin cancer.    See your dentist every six months for an exam and cleaning.    Patient Education     Tips to Control Acid Reflux    To control acid reflux, you ll need to make some basic diet and lifestyle changes. The simple steps outlined below may be all you ll need to ease discomfort.   Watch what you eat    Don't have fatty foods or spicy foods.    Eat fewer acidic foods, such as citrus and tomato-based foods. These can increase symptoms.    Limit drinking alcohol, caffeine, and fizzy beverages. All increase acid reflux.    Try limiting chocolate, peppermint, and spearmint. These  can make acid reflux worse in some people.    Watch when you eat    Don't lie down for 3 hours after eating.    Don't snack before going to bed.    Raise your head  Raising your head and upper body by 4 to 6 inches helps limit reflux when you re lying down. Put blocks under the head of your bed frame or a wedge under your mattress to raise it.   Other changes    Lose weight, if you need to    Don t exercise near bedtime    Don't wear tight-fitting clothes    Limit aspirin and ibuprofen    Stop smoking    [employer] last reviewed this educational content on 6/1/2019 2000-2021 [employer]. All rights reserved. This information is not intended as a substitute for professional medical care. Always follow your healthcare professional's instructions.           Patient Education     GERD (Adult)    The esophagus is a tube that carries food from the mouth to the stomach. A valve (the LES, lower esophageal sphincter) at the lower end of the esophagus prevents stomach acid from flowing upward. When this valve doesn't work properly, stomach contents may repeatedly flow back up (reflux) into the esophagus. This is called gastroesophageal reflux disease (GERD). GERD can irritate the esophagus. It can cause problems with pain, swallowing or breathing. In severe cases, GERD can cause recurrent pneumonia (from aspiration or breathing in particles) or other serious problems.  Symptoms of reflux include burning, pressure or sharp pain in the upper abdomen or mid to lower chest. The pain can spread to the neck, back, or shoulder. There may be belching, an acid taste in the back of the throat, chronic cough, or sore throat, or hoarseness. GERD symptoms often occur during the day after a big meal. They can also occur at night when lying down.   Home care  Lifestyle changes can help reduce symptoms. If needed, your healthcare provider may prescribe medicines. Symptoms often improve with treatment, but if treatment is  "stopped, the symptoms often return after a few months. So most persons with GERD will need to continue treatment or get treatment on and off.  Lifestyle changes    Limit or avoid fatty, fried, and spicy foods, as well as coffee, chocolate, mint, and foods with high acid content such as tomatoes and citrus fruit and juices (orange, grapefruit, lemon).    Don t eat large meals, especially at night. Frequent, smaller meals are best. Don't lie down right after eating. And don t eat anything 3 hours before going to bed.    Don't drink alcohol or smoke. As much as possible, stay away from second hand smoke.    If you are overweight, losing weight will reduce symptoms.     Don't wear tight clothing around your stomach area.    If your symptoms occur during sleep, use a foam wedge to elevate your upper body (not just your head.) Or, place 4\" blocks under the head of your bed. Or use 2 bed risers under your bedframe.  Medicines  If needed, medicines can help relieve the symptoms of GERD and prevent damage to the esophagus. Discuss a medicine plan with your healthcare provider. This may include one or more of the following medicines:    Antacids to help neutralize the normal acids in your stomach.    Acid blockers (Histamine or H2 blockers) to decrease acid production.    Acid inhibitors (proton pump inhibitors PPIs) to decrease acid production in a different way than the blockers. They may work better, but can take a little longer to take effect.  Take an antacid 30 to 60 minutes after eating and at bedtime, but not at the same time as an acid blocker.  Try not to take medicines such as ibuprofen and aspirin. If you are taking aspirin for your heart or other medical reasons, talk to your healthcare provider about stopping it.  Follow-up care  Follow up with your healthcare provider or as advised by our staff.  When to seek medical advice  Call your healthcare provider if any of the following occur:    Stomach pain gets worse " or moves to the lower right abdomen (appendix area)    Chest pain appears or gets worse, or spreads to the back, neck, shoulder, or arm    An over-the-counter trial of medicine doesn't relieve your symptoms    Weight loss that can't be explained    Trouble or pain swallowing    Frequent vomiting (can t keep down liquids)    Blood in the stool or vomit (red or black in color)    Feeling weak or dizzy    Fever of 100.4 F (38 C) or higher, or as directed by your healthcare provider  Tyrone last reviewed this educational content on 3/1/2018    7073-5245 [employer]. All rights reserved. This information is not intended as a substitute for professional medical care. Always follow your healthcare professional's instructions.

## 2021-10-21 NOTE — PROGRESS NOTES
{PROVIDER CHARTING PREFERENCE:854966}    Subjective   Aniyah is a 31 year old who presents for the following health issues {ACCOMPANIED BY STATEMENT (Optional):447250}    HPI     {SUPERLIST (Optional):846048}  {additonal problems for provider to add (Optional):922321}    Review of Systems   {ROS COMP (Optional):931076}      Objective    There were no vitals taken for this visit.  There is no height or weight on file to calculate BMI.  Physical Exam   {Exam List (Optional):837582}    {Diagnostic Test Results (Optional):424188}    {AMBULATORY ATTESTATION (Optional):022505}

## 2021-10-22 LAB
C TRACH DNA SPEC QL NAA+PROBE: NEGATIVE
N GONORRHOEA DNA SPEC QL NAA+PROBE: NEGATIVE

## 2021-10-23 ENCOUNTER — HEALTH MAINTENANCE LETTER (OUTPATIENT)
Age: 31
End: 2021-10-23

## 2021-11-09 ENCOUNTER — NURSE TRIAGE (OUTPATIENT)
Dept: NURSING | Facility: CLINIC | Age: 31
End: 2021-11-09
Payer: COMMERCIAL

## 2021-11-10 NOTE — TELEPHONE ENCOUNTER
Aniyah reports severe diarrhea since Sat and Flu like symptoms that started on Thu. -- ~10+ episodes of diarrhea today    She feels that every thing she tries to eat goes right through her.  She feels light headed & dizzy.     Flu symptoms since Thu  - cough  - nasal congestion  - fatigue  - body aches  - headache    Advised to see a Physician within 24 hours  Care Advice reviewed    Transferred to [facility]    COVID 19 Nurse Triage Plan/Patient Instructions    Please be aware that novel coronavirus (COVID-19) may be circulating in the community. If you develop symptoms such as fever, cough, or SOB or if you have concerns about the presence of another infection including coronavirus (COVID-19), please contact your health care provider or visit [email or web address].     Disposition/Instructions    In-Person Visit with provider recommended. Reference Visit Selection Guide.    Thank you for taking steps to prevent the spread of this virus.  o Limit your contact with others.  o Wear a simple mask to cover your cough.  o Wash your hands well and often.    Resources    [facility]: About COVID-19: [email or web address]    CDC: What to Do If You're Sick: www.cdc.gov/coronavirus/2019-ncov/about/steps-when-sick.html    CDC: Ending Home Isolation: www.cdc.gov/coronavirus/2019-ncov/hcp/disposition-in-home-patients.html     CDC: Caring for Someone: www.cdc.gov/coronavirus/2019-ncov/if-you-are-sick/care-for-someone.html     [facility]: Interim Guidance for Hospital Discharge to Home: www.health.[facility].mn.us/diseases/coronavirus/hcp/hospdischarge.pdf    [facility] clinical trials (COVID-19 research studies): [email or web address]     Below are the COVID-19 hotlines at the [place] Department of [facility] ([facility]). Interpreters are available.   o For health questions: Call 964-862-7426 or 1-716.381.2667 (7 a.m. to 7 p.m.)  o For questions about schools and childcare: Call [phone]  or 1-149.788.9947 (7 a.m. to 7 p.m.)     Meghann Montelongo RN  [facility]      Reason for Disposition    [1] SEVERE diarrhea (e.g., 7 or more times / day more than normal) AND [2] present > 24 hours (1 day)    Additional Information    Negative: [1] SEVERE abdominal pain (e.g., excruciating) AND [2] present > 1 hour    Negative: [1] SEVERE abdominal pain AND [2] age > 60    Negative: [1] Blood in the stool AND [2] moderate or large amount of blood    Negative: Black or tarry bowel movements  (Exception: chronic-unchanged  black-grey bowel movements AND is taking iron pills or Pepto-bismol)    Negative: [1] Drinking very little AND [2] dehydration suspected (e.g., no urine > 12 hours, very dry mouth, very lightheaded)    Negative: Patient sounds very sick or weak to the triager    Negative: [1] SEVERE diarrhea (e.g., 7 or more times / day more than normal) AND [2] age > 60 years    Negative: [1] Constant abdominal pain AND [2] present > 2 hours    Negative: [1] Fever > 103 F (39.4 C) AND [2] not able to get the fever down using Fever Care Advice    Protocols used: DIARRHEA-A-AH

## 2021-11-12 ENCOUNTER — TRANSFERRED RECORDS (OUTPATIENT)
Dept: HEALTH INFORMATION MANAGEMENT | Facility: CLINIC | Age: 31
End: 2021-11-12
Payer: COMMERCIAL

## 2021-11-13 ENCOUNTER — TRANSFERRED RECORDS (OUTPATIENT)
Dept: HEALTH INFORMATION MANAGEMENT | Facility: CLINIC | Age: 31
End: 2021-11-13
Payer: COMMERCIAL

## 2021-11-15 LAB
ALT SERPL-CCNC: 86 U/L (ref 8–45)
AST SERPL-CCNC: 60 U/L (ref 5–41)
CREATININE (EXTERNAL): 0.71 MG/DL (ref 0.57–1.11)
GFR ESTIMATED (EXTERNAL): >60 ML/MIN/1.73M2
GLUCOSE (EXTERNAL): 106 MG/DL (ref 70–105)
POTASSIUM (EXTERNAL): 4.3 MMOL/L (ref 3.5–5.1)

## 2021-11-22 ENCOUNTER — MYC MEDICAL ADVICE (OUTPATIENT)
Dept: FAMILY MEDICINE | Facility: OTHER | Age: 31
End: 2021-11-22
Payer: COMMERCIAL

## 2021-11-22 DIAGNOSIS — F41.9 ANXIETY: ICD-10-CM

## 2021-11-24 RX ORDER — ESCITALOPRAM OXALATE 20 MG/1
20 TABLET ORAL DAILY
Qty: 30 TABLET | Refills: 0 | Status: SHIPPED | OUTPATIENT
Start: 2021-11-24 | End: 2021-11-26

## 2021-11-24 NOTE — TELEPHONE ENCOUNTER
I provided her with a refill until she can see PCP.       LISA Atwood CNP  Questions or concerns please feel free to send me a St[employer] message or call me  Phone : [phone]

## 2021-11-24 NOTE — TELEPHONE ENCOUNTER
Pending Prescriptions:                       Disp   Refills    escitalopram (LEXAPRO) 20 MG tablet        90 tab*3        Sig: Take 1 tablet (20 mg) by mouth daily    Routing refill request to provider for review/approval because:  A break in medication

## 2021-11-26 PROBLEM — Z30.41 ENCOUNTER FOR SURVEILLANCE OF CONTRACEPTIVE PILLS: Status: RESOLVED | Noted: 2018-10-02 | Resolved: 2021-11-26

## 2021-11-26 PROBLEM — U07.1 PNEUMONIA DUE TO COVID-19 VIRUS: Status: ACTIVE | Noted: 2021-11-12

## 2021-11-26 PROBLEM — J12.82 PNEUMONIA DUE TO COVID-19 VIRUS: Status: ACTIVE | Noted: 2021-11-12

## 2021-11-29 ENCOUNTER — MYC MEDICAL ADVICE (OUTPATIENT)
Dept: FAMILY MEDICINE | Facility: OTHER | Age: 31
End: 2021-11-29
Payer: COMMERCIAL

## 2021-11-29 ENCOUNTER — NURSE TRIAGE (OUTPATIENT)
Dept: FAMILY MEDICINE | Facility: OTHER | Age: 31
End: 2021-11-29
Payer: COMMERCIAL

## 2021-11-29 NOTE — TELEPHONE ENCOUNTER
Patient sent the following mychart:   Patient also sent pictures through [employer].       Hello,   Please see the attached image/s of my right arm. I believe this appeared on Nov. 24th or 25th. It's gotten bigger and is painful. It's red, swollen, firm, warm, and not itchy. Taking ibuprofen helps with the swelling and pain. And I've been putting neosporin on it too.   Is it cellulitis? Or a rash of some kind...? I know I was asked numerous times about having a rash when asked about Covid, but I honestly didnt notice this bc of everything else going on.   What is our next steps? Feel free to call me for a virtual appointment or to set up a visit.   Thank you!  Aniyah Evans   [phone]

## 2021-11-29 NOTE — TELEPHONE ENCOUNTER
SITUATION:    Patient developed a rash on 11/25 and she thought it was related to laying on it. The area is warm, swollen, raised, and is painful when sitting there.  Patient denies fevers or difficult breathing.     BACKGROUND:   Patient had COVID the beginning of November.     HOME TREATMENTS:  Patient has been using IBU for swelling.   Patient has used neosporin. Worked a little bit.       HEALTH HISTORY:  NA     PATIENT REQUEST:   What should I do?     NURSE RECOMMENDATION:   Try 1% hydrocortisone. If the symptoms are not improving schedule a visit.     PLAN:   Will schedule a visit if symptoms don't improve or worsen.      RECOMMENDED DISPOSITION:  Home care advice   Will comply with recommendation: yes   If further questions/concerns or if Sx do not improve, worsen or new Sx develop, call your PCP or [facility] as soon as possible.    VIRAL ManriqueN, RN, PHN  [facility] [place]  November 29, 2021      Reason for Disposition    Mild localized rash    Additional Information    Negative: Possible contact with poison ivy or oak    Negative: Insect bite(s) suspected    Negative: Athlete's Foot suspected (i.e., itchy rash between the toes)    Negative: Jock Itch suspected (i.e., itchy rash on inner thighs near genital area)    Negative: Wound infection suspected (i.e., pain, spreading redness, or pus; in a cut, puncture, scrape or sutured wound)    Negative: Rash of external female genital area (vulva)    Negative: Rash of penis or scrotum    Negative: Small spot, skin growth, or mole    Negative: Fever and localized purple or blood-colored spots or dots that are not from injury or friction    Negative: Fever and localized rash is very painful    Negative: Patient sounds very sick or weak to the triager    Negative: Painful rash with multiple small blisters grouped together (i.e., dermatomal distribution or 'band' or 'stripe')    Negative: Localized rash is very painful (no fever)     Negative: Localized purple or blood-colored spots or dots that are not from injury or friction (no fever)    Negative: Lyme disease suspected (e.g., bull's-eye rash or tick bite / exposure)    Negative: Patient wants to be seen    Negative: Tender bumps in armpits    Negative: Pimples (localized) and no improvement after using CARE ADVICE    Negative: SEVERE local itching persists after 2 days of steroid cream    Negative: Applying cream or ointment and it causes severe itch, burning, or pain    Negative: Localized rash present > 7 days    Negative: Red, moist, irritated area between skin folds (or under larger breasts)    Negative: Localized area of skin darkening or thickening on lower legs or ankles and has NOT been evaluated by a doctor (or NP/PA)    Protocols used: RASH OR REDNESS - [id number]

## 2021-11-29 NOTE — TELEPHONE ENCOUNTER
Review phone encounter on 11/29/21.   VIRAL ManriqueN, RN, PHN  [place]  November 29, 2021

## 2022-01-14 ENCOUNTER — VIRTUAL VISIT (OUTPATIENT)
Dept: FAMILY MEDICINE | Facility: OTHER | Age: 32
End: 2022-01-14
Payer: COMMERCIAL

## 2022-01-14 DIAGNOSIS — Z86.16 PERSONAL HISTORY OF COVID-19: Primary | ICD-10-CM

## 2022-01-14 PROBLEM — J12.82 PNEUMONIA DUE TO COVID-19 VIRUS: Status: RESOLVED | Noted: 2021-11-12 | Resolved: 2022-01-14

## 2022-01-14 PROBLEM — U07.1 PNEUMONIA DUE TO COVID-19 VIRUS: Status: RESOLVED | Noted: 2021-11-12 | Resolved: 2022-01-14

## 2022-01-14 PROCEDURE — 99212 OFFICE O/P EST SF 10 MIN: CPT | Mod: 95 | Performed by: FAMILY MEDICINE

## 2022-01-14 NOTE — PROGRESS NOTES
Aniyah is a 31 year old who is being evaluated via a billable telephone visit.      Assessment & Plan     Personal history of COVID-19  Patient has weaned herself off of oxygen.  She feels her symptoms have resolved.  She has intermittent dizziness which we discussed using meclizine if she finds it bothersome.  We discussed staying hydrated.  She is starting to increase her exercise as she has been a bit deconditioned after this illness.  We also discussed staying healthy, good hygiene and the fact that influenza is now in the area.  She has not gotten the flu shot but she does not plan to be interacting with people    Jaycee Schultz MD  [facility]   Aniyah is a 31 year old who presents for the following health issues     HPI     Off oxygen for over a week.  Oxyen sats remaining around 98%. Still gets dizzy at times.  Yesterday was her first day of attempting normal shopping, especially when moving her head from side to side, didn't have to sit down but was happy that she had a cart to hold onto for balance.  Fatigue is better, feels stronger.  When she takes a deep breath, it no longer feels tight.  Keeping hydrated.  Still taking it easy.  Doesn't have to go into the office, at home most of the time.  Going up and down stairs is now easy, driving without problem.      Started pilates.    Review of Systems   CONSTITUTIONAL: NEGATIVE for fever, chills, change in weight  ENT/MOUTH: NEGATIVE for ear, mouth and throat problems  RESP: NEGATIVE for significant cough or shortness of breath   CV: NEGATIVE for chest pain, palpitations or peripheral edema      Objective           Vitals:  No vitals were obtained today due to virtual visit.    Physical Exam   Gen:  healthy, alert and no distress  PSYCH: Alert and oriented times 3; coherent speech, normal   rate and volume, able to articulate logical thoughts, able   to abstract reason, no tangential thoughts, no hallucinations   or  delusions  Her affect is normal  RESP: No cough, no audible wheezing, able to talk in full sentences  Remainder of exam unable to be completed due to telephone visits          Phone call duration: 8 minutes

## 2022-06-14 DIAGNOSIS — J31.0 CHRONIC RHINITIS: ICD-10-CM

## 2022-06-16 RX ORDER — FLUTICASONE PROPIONATE 50 MCG
SPRAY, SUSPENSION (ML) NASAL
Qty: 16 G | Refills: 0 | Status: SHIPPED | OUTPATIENT
Start: 2022-06-16 | End: 2022-09-29

## 2022-06-16 NOTE — TELEPHONE ENCOUNTER
Prescription approved per [facility] Refill Protocol.  Kassandra Mcgill RN on 6/16/2022 at 3:36 PM

## 2022-09-27 DIAGNOSIS — J31.0 CHRONIC RHINITIS: ICD-10-CM

## 2022-09-29 ENCOUNTER — MYC REFILL (OUTPATIENT)
Dept: FAMILY MEDICINE | Facility: OTHER | Age: 32
End: 2022-09-29

## 2022-09-29 DIAGNOSIS — J31.0 CHRONIC RHINITIS: ICD-10-CM

## 2022-09-29 RX ORDER — FLUTICASONE PROPIONATE 50 MCG
SPRAY, SUSPENSION (ML) NASAL
Qty: 16 G | Refills: 0 | Status: SHIPPED | OUTPATIENT
Start: 2022-09-29

## 2022-09-29 RX ORDER — FLUTICASONE PROPIONATE 50 MCG
SPRAY, SUSPENSION (ML) NASAL
Qty: 16 G | Refills: 0 | Status: CANCELLED | OUTPATIENT
Start: 2022-09-29

## 2022-10-09 ENCOUNTER — HEALTH MAINTENANCE LETTER (OUTPATIENT)
Age: 32
End: 2022-10-09

## 2022-11-26 ENCOUNTER — HEALTH MAINTENANCE LETTER (OUTPATIENT)
Age: 32
End: 2022-11-26

## 2023-11-06 ENCOUNTER — OFFICE VISIT (OUTPATIENT)
Dept: FAMILY MEDICINE | Facility: OTHER | Age: 33
End: 2023-11-06
Payer: COMMERCIAL

## 2023-11-06 VITALS
TEMPERATURE: 99.1 F | RESPIRATION RATE: 16 BRPM | SYSTOLIC BLOOD PRESSURE: 120 MMHG | HEART RATE: 75 BPM | WEIGHT: 193 LBS | DIASTOLIC BLOOD PRESSURE: 82 MMHG | OXYGEN SATURATION: 98 % | BODY MASS INDEX: 32.16 KG/M2

## 2023-11-06 DIAGNOSIS — L72.3 SEBACEOUS CYST: Primary | ICD-10-CM

## 2023-11-06 PROCEDURE — 99213 OFFICE O/P EST LOW 20 MIN: CPT | Performed by: NURSE PRACTITIONER

## 2023-11-06 RX ORDER — ACETAMINOPHEN 325 MG/1
650 TABLET ORAL EVERY 4 HOURS PRN
COMMUNITY
Start: 2021-11-15 | End: 2024-04-23

## 2023-11-06 RX ORDER — DROSPIRENONE AND ETHINYL ESTRADIOL 0.03MG-3MG
1 KIT ORAL DAILY
COMMUNITY
Start: 2021-11-15 | End: 2024-04-23

## 2023-11-06 RX ORDER — GINSENG 100 MG
2 CAPSULE ORAL DAILY
COMMUNITY
End: 2024-04-23

## 2023-11-06 RX ORDER — IBUPROFEN 800 MG/1
800 TABLET, FILM COATED ORAL EVERY 8 HOURS PRN
COMMUNITY
Start: 2023-04-22 | End: 2024-04-23

## 2023-11-06 ASSESSMENT — PAIN SCALES - GENERAL: PAINLEVEL: NO PAIN (0)

## 2023-11-06 NOTE — PROGRESS NOTES
Assessment & Plan     Sebaceous cyst  Suspect that this is a reoccurring cyst and needs further evaluation and removal. It is located along the crease of the left inner thigh. No currently draining or open. Scar tissue present. Patient notes that it randomly will swell and burst. Been going on for about a year so I do recommend evaluation and possible removal. Since I do not do this type of procedure I will refer her to general surgery.   - Adult General Surg Referral; Future    Scheduled for physical with me in the future to do pap and general health up date.     LISA [facility] DORIAN Dill is a 33 year old, presenting for the following health issues:  Cyst      History of Present Illness       Reason for visit:  Recheck the groin issue. Previous injury, but seemed to never heal/go away. 2 lumps get bigger when over extending that area. Then sometimes it gets big and burst, blood & puss comes out. Has happejed 3-4 times since fist time it occured a year or so ago.    She eats 0-1 servings of fruits and vegetables daily.She consumes 0 sweetened beverage(s) daily.She exercises with enough effort to increase her heart rate 20 to 29 minutes per day.  She exercises with enough effort to increase her heart rate 4 days per week.   She is taking medications regularly.     A year or so ago, she was lifting something heavy and getting out of her car. And pivoting onto the left side. When she got up she heard a pop in her groin. It did not hurt right away, but after that it hurt really bad and swelled a lot. She saw someone after that. She was using ice did not help. Heat helped and reduced swelling. Has not completely resolving. It has not gone away. It will come back randomly. Not consistent with what she is doing. It has bursted before and blood and pus has come out. Usually hard underneath.       Review of Systems         Objective    /82   Pulse 75   Temp  99.1  F (37.3  C) (Temporal)   Resp 16   Wt 87.5 kg (193 lb)   SpO2 98%   BMI 32.16 kg/m    Body mass index is 32.16 kg/m .  Physical Exam  Genitourinary:              Diagnostic: None

## 2023-11-10 ENCOUNTER — OFFICE VISIT (OUTPATIENT)
Dept: SURGERY | Facility: CLINIC | Age: 33
End: 2023-11-10
Payer: COMMERCIAL

## 2023-11-10 VITALS
WEIGHT: 193 LBS | BODY MASS INDEX: 32.15 KG/M2 | HEIGHT: 65 IN | DIASTOLIC BLOOD PRESSURE: 76 MMHG | SYSTOLIC BLOOD PRESSURE: 116 MMHG | TEMPERATURE: 98.9 F

## 2023-11-10 DIAGNOSIS — L72.3 SEBACEOUS CYST: ICD-10-CM

## 2023-11-10 PROCEDURE — 99203 OFFICE O/P NEW LOW 30 MIN: CPT | Mod: 25 | Performed by: SURGERY

## 2023-11-10 PROCEDURE — 88304 TISSUE EXAM BY PATHOLOGIST: CPT | Performed by: PATHOLOGY

## 2023-11-10 PROCEDURE — 11402 EXC TR-EXT B9+MARG 1.1-2 CM: CPT | Performed by: SURGERY

## 2023-11-10 PROCEDURE — 12031 INTMD RPR S/A/T/EXT 2.5 CM/<: CPT | Performed by: SURGERY

## 2023-11-10 ASSESSMENT — PAIN SCALES - GENERAL: PAINLEVEL: NO PAIN (1)

## 2023-11-10 NOTE — PROGRESS NOTES
Patient seen in consultation for left groin cyst by Martina Rosario    HPI:  Patient is a 33 year old female with history of apparent cyst of the left groin.  She states it swells and then subsequently drains.  This happens usually once a month or more and is becoming more frequent.  She has nothing else similar in any other area of her body.  She does not take blood thinning medication.    Review Of Systems    Skin: as above  Ears/Nose/Throat: negative  Respiratory: No shortness of breath, dyspnea on exertion, cough, or hemoptysis  Cardiovascular: negative  Gastrointestinal: negative  Genitourinary: negative  Musculoskeletal: negative  Neurologic: negative  Hematologic/Lymphatic/Immunologic: negative  Endocrine: negative      Past Medical History:   Diagnosis Date    Pneumonia due to COVID-19 virus 11/12/2021       No past surgical history on file.    Family History   Problem Relation Age of Onset    Lipids Mother     Gynecology Mother         cysts    Hyperlipidemia Mother     Lipids Maternal Grandfather     Cardiovascular Maternal Grandfather     Gastrointestinal Disease Maternal Grandfather         esophageal problem    Genitourinary Problems Maternal Grandfather         kidney concerns    Heart Disease Maternal Grandfather     Diabetes Maternal Grandfather     Hypertension Maternal Grandfather     Cerebrovascular Disease Maternal Grandfather     Myocardial Infarction Maternal Grandfather     Coronary Artery Disease Maternal Grandfather     Hyperlipidemia Maternal Grandfather     Eye Disorder Maternal Grandmother     Lipids Maternal Grandmother     Diabetes Maternal Grandmother     Hypertension Maternal Grandmother     Crohn's Disease Maternal Grandmother     Liver Cancer Maternal Grandmother     Dementia Maternal Grandmother         brought on by ammonia    Obesity Maternal Grandmother         Has passed away    Thyroid Disease Paternal Grandmother         graves disease    Colon Cancer Paternal Grandmother      Obesity Paternal Grandmother     Unknown/Adopted Paternal Grandfather     Diabetes Maternal Aunt     Cancer Maternal Aunt         lymph node cancer    Diabetes Maternal Uncle     Cardiovascular Paternal Uncle         heart attack -  60's    Congenital Anomalies Paternal Uncle         cerebal palsy    Colon Cancer Paternal Uncle 54    Colon Cancer Paternal Aunt 48    Diabetes Other         Aunt, moms side    Depression Other         Great aunt on moms side    Anxiety Disorder Other         Aunt on moms side    Diabetes Other         Type 2 - mons side    Colon Cancer Other         Uncle on dads side    Other Cancer Other         Aunt on moms side, lymphnod    C.A.D. No family hx of     Arthritis No family hx of     Alzheimer Disease No family hx of     Circulatory No family hx of     Musculoskeletal Disorder No family hx of     Neurologic Disorder No family hx of     Respiratory No family hx of     Blood Disease No family hx of     Depression No family hx of     Genetic Disorder No family hx of     Psychotic Disorder No family hx of     Breast Cancer No family hx of     Cancer - colorectal No family hx of     Prostate Cancer No family hx of         Uncle on moms side    Anesthesia Reaction No family hx of     Asthma No family hx of     Osteoporosis No family hx of     Obesity No family hx of     Mental Illness No family hx of     Substance Abuse No family hx of        Social History     Socioeconomic History    Marital status: Single     Spouse name: Not on file    Number of children: Not on file    Years of education: Not on file    Highest education level: Not on file   Occupational History    Not on file   Tobacco Use    Smoking status: Never    Smokeless tobacco: Never    Tobacco comments:     no smokers in household   Vaping Use    Vaping Use: Never used   Substance and Sexual Activity    Alcohol use: Yes     Comment: occ    Drug use: No    Sexual activity: Yes     Partners: Male     Birth control/protection:  Pill   Other Topics Concern    Parent/sibling w/ CABG, MI or angioplasty before 65F 55M? No   Social History Narrative    Not on file     Social Determinants of Health     Financial Resource Strain: Low Risk  (11/4/2023)    Financial Resource Strain     Within the past 12 months, have you or your family members you live with been unable to get utilities (heat, electricity) when it was really needed?: No   Food Insecurity: Low Risk  (11/4/2023)    Food Insecurity     Within the past 12 months, did you worry that your food would run out before you got money to buy more?: No     Within the past 12 months, did the food you bought just not last and you didn t have money to get more?: No   Transportation Needs: Low Risk  (11/4/2023)    Transportation Needs     Within the past 12 months, has lack of transportation kept you from medical appointments, getting your medicines, non-medical meetings or appointments, work, or from getting things that you need?: No   Physical Activity: Not on file   Stress: Not on file   Social Connections: Not on file   Interpersonal Safety: Low Risk  (11/6/2023)    Interpersonal Safety     Do you feel physically and emotionally safe where you currently live?: Yes     Within the past 12 months, have you been hit, slapped, kicked or otherwise physically hurt by someone?: No     Within the past 12 months, have you been humiliated or emotionally abused in other ways by your partner or ex-partner?: No   Housing Stability: Low Risk  (11/4/2023)    Housing Stability     Do you have housing? : Yes     Are you worried about losing your housing?: No       Current Outpatient Medications   Medication Sig Dispense Refill    acetaminophen (TYLENOL) 325 MG tablet Take 650 mg by mouth every 4 hours as needed for pain      clindamycin (CLEOCIN T) 1 % external solution Apply topically 2 times daily 60 mL 11    cranberry 200 MG CAPS capsule Take 2 capsules by mouth daily      Cranberry-Vitamin C (AZO CRANBERRY  "URINARY TRACT PO) Take 500 mg by mouth daily      fluticasone (FLONASE) 50 MCG/ACT nasal spray SHAKE LIQUID AND USE 1 TO 2 SPRAYS IN EACH NOSTRIL DAILY AS NEEDED 16 g 0    ibuprofen (ADVIL/MOTRIN) 800 MG tablet Take 800 mg by mouth every 8 hours as needed for other or moderate pain      drospirenone-ethinyl estradiol (OMER) 3-0.03 MG tablet Take 1 tablet by mouth daily (Patient not taking: Reported on 11/6/2023)         Medications and history reviewed    Physical exam:  Vitals: /76   Temp 98.9  F (37.2  C) (Temporal)   Ht 1.648 m (5' 4.9\")   Wt 87.5 kg (193 lb)   BMI 32.22 kg/m    BMI= Body mass index is 32.22 kg/m .    Constitutional: alert, non-distressed   Head: normo-cephalic, atraumatic   Cardiovascular: RRR  Respiratory: equal chest rise, good respiratory effort, no audible wheeze  Gastrointestinal: Soft, non-tender, non distended, no masses or hernias palpated   : deferred  Musculoskeletal: moves all extremities   Skin: Upper left medial thigh in the groin area there is a chronic appearing draining area consistent with chronic abscess or recurring infected cyst.  Has the appearance of hidradenitis but no other lesions but this 1 noted on exam.  Psychiatric: mentation appears normal, affect appropriate   Patient able to get up on table without difficulty.    Labs show:  No results found for this or any previous visit (from the past 24 hour(s)).    Imaging shows:  No results found for this or any previous visit (from the past 744 hour(s)).     Assessment:     ICD-10-CM    1. Sebaceous cyst  L72.3 Adult General Surg Referral        Plan: We discussed natural course of cyst versus hidradenitis.  Either way, given the recurring nature of the drainage and chronicity I recommend excision today in the office.  We discussed how if this is a cyst, excision should be definitive.  If it is hidradenitis there may be recurrence in this area or other areas of her body.  After our discussion we agreed to " proceed with in office excision of recurrent infected cyst.    60 minutes spent by me on the date of the encounter doing chart review, history and exam, documentation and further activities per the note, 30 minutes of which was the procedure itself    PROCEDURE: Excision cyst left groin     Written consent was obtained    The left groin area was prepped and appropriately anesthetized with 1% lidocaine with epinephrine. Using the usual technique, excision of subcutaneous cyst with overlying ellipse of skin was performed. The total area excised, including lesion and margins was 2.0 x 1.0 x 1.2 cm.  Closure was accomplished with interrupted 3-0 vicryl for the dermal layer and running subcuticular 4-0 monocryl for the skin. Total length of the incision after closure was 2.0 cm. An appropriate dressing was applied.  The procedure was well tolerated and without complications. Specimen was sent to pathology.        Maxwell Hernandez DO

## 2023-11-10 NOTE — LETTER
11/10/2023         RE: Aniyah Evans  [address]  [facility] [address]        Dear Colleague,    Thank you for referring your patient, Aniyah Evans, to the [facility]. Please see a copy of my visit note below.    Patient seen in consultation for left groin cyst by Martina Rosario    HPI:  Patient is a 33 year old female with history of apparent cyst of the left groin.  She states it swells and then subsequently drains.  This happens usually once a month or more and is becoming more frequent.  She has nothing else similar in any other area of her body.  She does not take blood thinning medication.    Review Of Systems    Skin: as above  Ears/Nose/Throat: negative  Respiratory: No shortness of breath, dyspnea on exertion, cough, or hemoptysis  Cardiovascular: negative  Gastrointestinal: negative  Genitourinary: negative  Musculoskeletal: negative  Neurologic: negative  Hematologic/Lymphatic/Immunologic: negative  Endocrine: negative      Past Medical History:   Diagnosis Date     Pneumonia due to COVID-19 virus 11/12/2021       No past surgical history on file.    Family History   Problem Relation Age of Onset     Lipids Mother      Gynecology Mother         cysts     Hyperlipidemia Mother      Lipids Maternal Grandfather      Cardiovascular Maternal Grandfather      Gastrointestinal Disease Maternal Grandfather         esophageal problem     Genitourinary Problems Maternal Grandfather         kidney concerns     Heart Disease Maternal Grandfather      Diabetes Maternal Grandfather      Hypertension Maternal Grandfather      Cerebrovascular Disease Maternal Grandfather      Myocardial Infarction Maternal Grandfather      Coronary Artery Disease Maternal Grandfather      Hyperlipidemia Maternal Grandfather      Eye Disorder Maternal Grandmother      Lipids Maternal Grandmother      Diabetes Maternal Grandmother      Hypertension Maternal Grandmother      Crohn's Disease  Maternal Grandmother      Liver Cancer Maternal Grandmother      Dementia Maternal Grandmother         brought on by ammonia     Obesity Maternal Grandmother         Has passed away     Thyroid Disease Paternal Grandmother         graves disease     Colon Cancer Paternal Grandmother      Obesity Paternal Grandmother      Unknown/Adopted Paternal Grandfather      Diabetes Maternal Aunt      Cancer Maternal Aunt         lymph node cancer     Diabetes Maternal Uncle      Cardiovascular Paternal Uncle         heart attack -  60's     Congenital Anomalies Paternal Uncle         cerebal palsy     Colon Cancer Paternal Uncle 54     Colon Cancer Paternal Aunt 48     Diabetes Other         Aunt, moms side     Depression Other         Great aunt on moms side     Anxiety Disorder Other         Aunt on moms side     Diabetes Other         Type 2 - mons side     Colon Cancer Other         Uncle on dads side     Other Cancer Other         Aunt on moms side, lymphnod     C.A.D. No family hx of      Arthritis No family hx of      Alzheimer Disease No family hx of      Circulatory No family hx of      Musculoskeletal Disorder No family hx of      Neurologic Disorder No family hx of      Respiratory No family hx of      Blood Disease No family hx of      Depression No family hx of      Genetic Disorder No family hx of      Psychotic Disorder No family hx of      Breast Cancer No family hx of      Cancer - colorectal No family hx of      Prostate Cancer No family hx of         Uncle on moms side     Anesthesia Reaction No family hx of      Asthma No family hx of      Osteoporosis No family hx of      Obesity No family hx of      Mental Illness No family hx of      Substance Abuse No family hx of        Social History     Socioeconomic History     Marital status: Single     Spouse name: Not on file     Number of children: Not on file     Years of education: Not on file     Highest education level: Not on file   Occupational History      Not on file   Tobacco Use     Smoking status: Never     Smokeless tobacco: Never     Tobacco comments:     no smokers in household   Vaping Use     Vaping Use: Never used   Substance and Sexual Activity     Alcohol use: Yes     Comment: occ     Drug use: No     Sexual activity: Yes     Partners: Male     Birth control/protection: Pill   Other Topics Concern     Parent/sibling w/ CABG, MI or angioplasty before 65F 55M? No   Social History Narrative     Not on file     Social Determinants of Health     Financial Resource Strain: Low Risk  (11/4/2023)    Financial Resource Strain      Within the past 12 months, have you or your family members you live with been unable to get utilities (heat, electricity) when it was really needed?: No   Food Insecurity: Low Risk  (11/4/2023)    Food Insecurity      Within the past 12 months, did you worry that your food would run out before you got money to buy more?: No      Within the past 12 months, did the food you bought just not last and you didn t have money to get more?: No   Transportation Needs: Low Risk  (11/4/2023)    Transportation Needs      Within the past 12 months, has lack of transportation kept you from medical appointments, getting your medicines, non-medical meetings or appointments, work, or from getting things that you need?: No   Physical Activity: Not on file   Stress: Not on file   Social Connections: Not on file   Interpersonal Safety: Low Risk  (11/6/2023)    Interpersonal Safety      Do you feel physically and emotionally safe where you currently live?: Yes      Within the past 12 months, have you been hit, slapped, kicked or otherwise physically hurt by someone?: No      Within the past 12 months, have you been humiliated or emotionally abused in other ways by your partner or ex-partner?: No   Housing Stability: Low Risk  (11/4/2023)    Housing Stability      Do you have housing? : Yes      Are you worried about losing your housing?: No       Current  "Outpatient Medications   Medication Sig Dispense Refill     acetaminophen (TYLENOL) 325 MG tablet Take 650 mg by mouth every 4 hours as needed for pain       clindamycin (CLEOCIN T) 1 % external solution Apply topically 2 times daily 60 mL 11     cranberry 200 MG CAPS capsule Take 2 capsules by mouth daily       Cranberry-Vitamin C (AZO CRANBERRY URINARY TRACT PO) Take 500 mg by mouth daily       fluticasone (FLONASE) 50 MCG/ACT nasal spray SHAKE LIQUID AND USE 1 TO 2 SPRAYS IN EACH NOSTRIL DAILY AS NEEDED 16 g 0     ibuprofen (ADVIL/MOTRIN) 800 MG tablet Take 800 mg by mouth every 8 hours as needed for other or moderate pain       drospirenone-ethinyl estradiol (OMER) 3-0.03 MG tablet Take 1 tablet by mouth daily (Patient not taking: Reported on 11/6/2023)         Medications and history reviewed    Physical exam:  Vitals: /76   Temp 98.9  F (37.2  C) (Temporal)   Ht 1.648 m (5' 4.9\")   Wt 87.5 kg (193 lb)   BMI 32.22 kg/m    BMI= Body mass index is 32.22 kg/m .    Constitutional: alert, non-distressed   Head: normo-cephalic, atraumatic   Cardiovascular: RRR  Respiratory: equal chest rise, good respiratory effort, no audible wheeze  Gastrointestinal: Soft, non-tender, non distended, no masses or hernias palpated   : deferred  Musculoskeletal: moves all extremities   Skin: Upper left medial thigh in the groin area there is a chronic appearing draining area consistent with chronic abscess or recurring infected cyst.  Has the appearance of hidradenitis but no other lesions but this 1 noted on exam.  Psychiatric: mentation appears normal, affect appropriate   Patient able to get up on table without difficulty.    Labs show:  No results found for this or any previous visit (from the past 24 hour(s)).    Imaging shows:  No results found for this or any previous visit (from the past 744 hour(s)).     Assessment:     ICD-10-CM    1. Sebaceous cyst  L72.3 Adult General Surg Referral        Plan: We discussed " natural course of cyst versus hidradenitis.  Either way, given the recurring nature of the drainage and chronicity I recommend excision today in the office.  We discussed how if this is a cyst, excision should be definitive.  If it is hidradenitis there may be recurrence in this area or other areas of her body.  After our discussion we agreed to proceed with in office excision of recurrent infected cyst.    60 minutes spent by me on the date of the encounter doing chart review, history and exam, documentation and further activities per the note, 30 minutes of which was the procedure itself    PROCEDURE: Excision cyst left groin     Written consent was obtained    The left groin area was prepped and appropriately anesthetized with 1% lidocaine with epinephrine. Using the usual technique, excision of subcutaneous cyst with overlying ellipse of skin was performed. The total area excised, including lesion and margins was 2.0 x 1.0 x 1.2 cm.  Closure was accomplished with interrupted 3-0 vicryl for the dermal layer and running subcuticular 4-0 monocryl for the skin. Total length of the incision after closure was 2.0 cm. An appropriate dressing was applied.  The procedure was well tolerated and without complications. Specimen was sent to pathology.        Maxwell Hernandez DO      Again, thank you for allowing me to participate in the care of your patient.        Sincerely,        Maxwell Hernandez DO

## 2023-11-14 LAB
PATH REPORT.COMMENTS IMP SPEC: NORMAL
PATH REPORT.FINAL DX SPEC: NORMAL
PATH REPORT.GROSS SPEC: NORMAL
PATH REPORT.MICROSCOPIC SPEC OTHER STN: NORMAL
PATH REPORT.RELEVANT HX SPEC: NORMAL

## 2023-12-07 NOTE — PROGRESS NOTES
SUBJECTIVE:   CC: Aniyah Evans is an 31 year old woman who presents for preventive health visit.       Patient has been advised of split billing requirements and indicates understanding: Yes  Healthy Habits:     Getting at least 3 servings of Calcium per day:  Yes    Bi-annual eye exam:  Yes    Dental care twice a year:  Yes    Sleep apnea or symptoms of sleep apnea:  None    Diet:  Gluten-free/reduced and Other    Frequency of exercise:  1 day/week    Duration of exercise:  Less than 15 minutes    Taking medications regularly:  Yes    Medication side effects:  Not applicable    PHQ-2 Total Score: 1    Additional concerns today:  Yes      Today's PHQ-2 Score:   PHQ-2 ( 1999 [employer]) 10/19/2021   Q1: Little interest or pleasure in doing things 1   Q2: Feeling down, depressed or hopeless 0   PHQ-2 Score 1   Q1: Little interest or pleasure in doing things Several days   Q2: Feeling down, depressed or hopeless Not at all   PHQ-2 Score 1       Abuse: Current or Past (Physical, Sexual or Emotional) - No  Do you feel safe in your environment? Yes    Have you ever done Advance Care Planning? (For example, a Health Directive, POLST, or a discussion with a medical provider or your loved ones about your wishes): No, advance care planning information given to patient to review.  Patient declined advance care planning discussion at this time.    Social History     Tobacco Use     Smoking status: Never Smoker     Smokeless tobacco: Never Used     Tobacco comment: no smokers in household   Substance Use Topics     Alcohol use: Yes     Comment: occ         Alcohol Use 10/19/2021   Prescreen: >3 drinks/day or >7 drinks/week? No   Prescreen: >3 drinks/day or >7 drinks/week? -       Reviewed orders with patient.  Reviewed health maintenance and updated orders accordingly - Yes  Lab work is in process    Breast Cancer Screening:    FHS-7:   Breast CA Risk Assessment (FHS-7) 10/19/2021   Did any of your first-degree relatives have  breast or ovarian cancer? No   Did any of your relatives have bilateral breast cancer? No   Did any man in your family have breast cancer? No   Did any woman in your family have breast and ovarian cancer? No   Did any woman in your family have breast cancer before age 50 y? No   Do you have 2 or more relatives with breast and/or ovarian cancer? No   Do you have 2 or more relatives with breast and/or bowel cancer? No     click delete button to remove this line now  Patient under 40 years of age: Routine Mammogram Screening not recommended.   Pertinent mammograms are reviewed under the imaging tab.    History of abnormal Pap smear: NO - age 30- 65 PAP every 3 years recommended  PAP / HPV 6/25/2019 1/29/2016 12/10/2012   PAP (Historical) NIL NIL NIL     Reviewed and updated as needed this visit by clinical staff  Tobacco  Allergies  Meds   Med Hx  Surg Hx  Fam Hx  Soc Hx        Reviewed and updated as needed this visit by Provider                History reviewed. No pertinent past medical history.   History reviewed. No pertinent surgical history.    Review of Systems   Constitutional: Negative for chills and fever.   HENT: Negative for congestion, ear pain, hearing loss and sore throat.    Eyes: Negative for pain and visual disturbance.   Respiratory: Negative for cough and shortness of breath.    Cardiovascular: Negative for chest pain, palpitations and peripheral edema.   Gastrointestinal: Positive for heartburn. Negative for abdominal pain, constipation, diarrhea, hematochezia and nausea.   Breasts:  Positive for tenderness. Negative for breast mass and discharge.   Genitourinary: Positive for vaginal discharge. Negative for dysuria, frequency, genital sores, hematuria, pelvic pain, urgency and vaginal bleeding.   Musculoskeletal: Negative for arthralgias, joint swelling and myalgias.   Skin: Negative for rash.   Neurological: Positive for headaches. Negative for dizziness, weakness and paresthesias.  "  Psychiatric/Behavioral: Negative for mood changes. The patient is nervous/anxious.         OBJECTIVE:   /70   Pulse 74   Temp 97.5  F (36.4  C) (Temporal)   Resp 18   Ht 1.65 m (5' 4.96\")   Wt 91.2 kg (201 lb)   SpO2 99%   BMI 33.49 kg/m    Physical Exam  GENERAL: healthy, alert and no distress  EYES: Eyes grossly normal to inspection, PERRL and conjunctivae and sclerae normal  HENT: ear canals and TM's normal, nose and mouth without ulcers or lesions  NECK: no adenopathy, no asymmetry, masses, or scars and thyroid normal to palpation  RESP: lungs clear to auscultation - no rales, rhonchi or wheezes  BREAST: normal without masses, tenderness or nipple discharge and no palpable axillary masses or adenopathy  CV: regular rate and rhythm, normal S1 S2, no S3 or S4, no murmur, click or rub, no peripheral edema and peripheral pulses strong  ABDOMEN: soft, nontender, no hepatosplenomegaly, no masses and bowel sounds normal  MS: no gross musculoskeletal defects noted, no edema  SKIN: no suspicious lesions or rashes  NEURO: Normal strength and tone, mentation intact and speech normal  PSYCH: mentation appears normal, affect normal/bright    Diagnostic Test Results:  Labs reviewed in Epic  Results for orders placed or performed in visit on 10/21/21   Hepatitis C Screen Reflex to HCV RNA Quant and Genotype     Status: Normal   Result Value Ref Range    Hepatitis C Antibody Nonreactive Nonreactive    Narrative    Assay performance characteristics have not been established for newborns, infants, and children.   Lipid panel reflex to direct LDL Fasting     Status: Abnormal   Result Value Ref Range    Cholesterol 216 (H) <200 mg/dL    Triglycerides 190 (H) <150 mg/dL    Direct Measure HDL 51 >=50 mg/dL    LDL Cholesterol Calculated 127 (H) <=100 mg/dL    Non HDL Cholesterol 165 (H) <130 mg/dL    Patient Fasting > 8hrs? Yes     Narrative    Cholesterol  Desirable:  <200 mg/dL    Triglycerides  Normal:  Less than 150 " mg/dL  Borderline High:  150-199 mg/dL  High:  200-499 mg/dL  Very High:  Greater than or equal to 500 mg/dL    Direct Measure HDL  Female:  Greater than or equal to 50 mg/dL   Male:  Greater than or equal to 40 mg/dL    LDL Cholesterol  Desirable:  <100mg/dL  Above Desirable:  100-129 mg/dL   Borderline High:  130-159 mg/dL   High:  160-189 mg/dL   Very High:  >= 190 mg/dL    Non HDL Cholesterol  Desirable:  130 mg/dL  Above Desirable:  130-159 mg/dL  Borderline High:  160-189 mg/dL  High:  190-219 mg/dL  Very High:  Greater than or equal to 220 mg/dL   Comprehensive metabolic panel (BMP + Alb, Alk Phos, ALT, AST, Total. Bili, TP)     Status: Abnormal   Result Value Ref Range    Sodium 140 133 - 144 mmol/L    Potassium 3.7 3.4 - 5.3 mmol/L    Chloride 109 94 - 109 mmol/L    Carbon Dioxide (CO2) 26 20 - 32 mmol/L    Anion Gap 5 3 - 14 mmol/L    Urea Nitrogen 10 7 - 30 mg/dL    Creatinine 0.80 0.52 - 1.04 mg/dL    Calcium 8.5 8.5 - 10.1 mg/dL    Glucose 104 (H) 70 - 99 mg/dL    Alkaline Phosphatase 71 40 - 150 U/L    AST 10 0 - 45 U/L    ALT 16 0 - 50 U/L    Protein Total 7.5 6.8 - 8.8 g/dL    Albumin 3.4 3.4 - 5.0 g/dL    Bilirubin Total 0.4 0.2 - 1.3 mg/dL    GFR Estimate >90 >60 mL/min/1.73m2   Treponema Abs w Reflex to RPR and Titer     Status: Normal   Result Value Ref Range    Treponema Antibody Total Nonreactive Nonreactive   HIV Antigen Antibody Combo     Status: Normal   Result Value Ref Range    HIV Antigen Antibody Combo Nonreactive Nonreactive   Wet prep - Clinic Collect     Status: Abnormal    Specimen: Vagina; Swab   Result Value Ref Range    Trichomonas Absent Absent    Yeast Absent Absent    Clue Cells Absent Absent    WBCs/high power field 2+ (A) None   Chlamydia trachomatis PCR     Status: Normal    Specimen: Urine, Voided   Result Value Ref Range    Chlamydia trachomatis Negative Negative   Neisseria gonorrhoeae PCR     Status: Normal    Specimen: Urine, Voided   Result Value Ref Range    Neisseria  "gonorrhoeae Negative Negative       ASSESSMENT/PLAN:   (Z00.00) Routine general medical examination at a health care facility  (primary encounter diagnosis)  Comment:   Plan: Lipid panel reflex to direct LDL Fasting,         Comprehensive metabolic panel (BMP + Alb, Alk         Phos, ALT, AST, Total. Bili, TP)        Updated HM     (L70.0) Acne vulgaris  Comment:   Plan: clindamycin (CLEOCIN T) 1 % external solution        Continued use along with OTC cleaning      (Z11.59) Need for hepatitis C screening test  Comment:   Plan: Hepatitis C Screen Reflex to HCV RNA Quant and         Genotype            (Z30.41) Encounter for surveillance of contraceptive pills  Comment:   Plan: drospirenone-ethinyl estradiol (SANDHYA) 3-0.03         MG tablet        Doing well on birth control. Wants ultrasound today due to some pelvic pain and wanting to be sure that the birth control is helping reduce ovarian cysts.     (Z11.3) Screen for STD (sexually transmitted disease)  Comment:   Plan: Chlamydia trachomatis PCR, Neisseria         gonorrhoeae PCR, Wet prep - Clinic Collect,         Treponema Abs w Reflex to RPR and Titer, HIV         Antigen Antibody Combo        Would like STD screening, no symptoms.     (Z13.220) Screening for hyperlipidemia  Comment:   Plan: Lipid panel reflex to direct LDL Fasting            (Z13.228) Screening for metabolic disorder  Comment:   Plan: Comprehensive metabolic panel (BMP + Alb, Alk         Phos, ALT, AST, Total. Bili, TP)            (Z87.42) History of ovarian cyst  Comment:   Plan: US Pelvic Complete with Transvaginal, Ob/Gyn         Referral        As noted above       Patient has been advised of split billing requirements and indicates understanding: Yes  COUNSELING:  Reviewed preventive health counseling, as reflected in patient instructions    Estimated body mass index is 33.49 kg/m  as calculated from the following:    Height as of this encounter: 1.65 m (5' 4.96\").    Weight as of this " encounter: 91.2 kg (201 lb).    Weight management plan: Discussed healthy diet and exercise guidelines    She reports that she has never smoked. She has never used smokeless tobacco.      Counseling Resources:  ATP IV Guidelines  Pooled Cohorts Equation Calculator  Breast Cancer Risk Calculator  BRCA-Related Cancer Risk Assessment: FHS-7 Tool  FRAX Risk Assessment  ICSI Preventive Guidelines  Dietary Guidelines for Americans, 2010  USDA's MyPlate  ASA Prophylaxis  Lung CA Screening    LISA Atwood CNP  [facility]     MEDICATIONS  (STANDING):  apixaban 5 milliGRAM(s) Oral every 12 hours  aspirin enteric coated 81 milliGRAM(s) Oral daily  atorvastatin 40 milliGRAM(s) Oral at bedtime  carvedilol 3.125 milliGRAM(s) Oral every 12 hours  famotidine    Tablet 20 milliGRAM(s) Oral daily  insulin glargine Injectable (LANTUS) 17 Unit(s) SubCutaneous at bedtime  insulin lispro (ADMELOG) corrective regimen sliding scale   SubCutaneous at bedtime  insulin lispro (ADMELOG) corrective regimen sliding scale   SubCutaneous three times a day before meals  insulin lispro Injectable (ADMELOG) 10 Unit(s) SubCutaneous three times a day with meals  piperacillin/tazobactam IVPB.. 3.375 Gram(s) IV Intermittent every 8 hours  potassium phosphate / sodium phosphate Tablet (K-PHOS No. 2) 2 Tablet(s) Oral two times a day  predniSONE   Tablet 10 milliGRAM(s) Oral daily  tacrolimus ER Tablet (ENVARSUS XR) 5 milliGRAM(s) Oral <User Schedule>  trimethoprim   80 mG/sulfamethoxazole 400 mG 1 Tablet(s) Oral daily  vancomycin  IVPB 1250 milliGRAM(s) IV Intermittent every 12 hours    MEDICATIONS  (PRN):

## 2024-01-05 ENCOUNTER — TELEPHONE (OUTPATIENT)
Dept: FAMILY MEDICINE | Facility: OTHER | Age: 34
End: 2024-01-05
Payer: COMMERCIAL

## 2024-01-05 NOTE — TELEPHONE ENCOUNTER
Please call patient she is scheduled for a physical in a virtual slot. Please reschedule ok to use any slot. Could do 5:00 that day instead as would like to keep my last slot as virtual      LISA Atwood CNP  Questions or concerns please feel free to send me a [employer] message or call me  Phone : [phone]

## 2024-01-06 ENCOUNTER — HEALTH MAINTENANCE LETTER (OUTPATIENT)
Age: 34
End: 2024-01-06

## 2024-04-02 SDOH — HEALTH STABILITY: PHYSICAL HEALTH: ON AVERAGE, HOW MANY DAYS PER WEEK DO YOU ENGAGE IN MODERATE TO STRENUOUS EXERCISE (LIKE A BRISK WALK)?: 2 DAYS

## 2024-04-02 SDOH — HEALTH STABILITY: PHYSICAL HEALTH: ON AVERAGE, HOW MANY MINUTES DO YOU ENGAGE IN EXERCISE AT THIS LEVEL?: 40 MIN

## 2024-04-02 ASSESSMENT — SOCIAL DETERMINANTS OF HEALTH (SDOH): HOW OFTEN DO YOU GET TOGETHER WITH FRIENDS OR RELATIVES?: MORE THAN THREE TIMES A WEEK

## 2024-04-22 SDOH — HEALTH STABILITY: PHYSICAL HEALTH: ON AVERAGE, HOW MANY MINUTES DO YOU ENGAGE IN EXERCISE AT THIS LEVEL?: 40 MIN

## 2024-04-22 SDOH — HEALTH STABILITY: PHYSICAL HEALTH: ON AVERAGE, HOW MANY DAYS PER WEEK DO YOU ENGAGE IN MODERATE TO STRENUOUS EXERCISE (LIKE A BRISK WALK)?: 3 DAYS

## 2024-04-22 ASSESSMENT — SOCIAL DETERMINANTS OF HEALTH (SDOH): HOW OFTEN DO YOU GET TOGETHER WITH FRIENDS OR RELATIVES?: MORE THAN THREE TIMES A WEEK

## 2024-04-23 ENCOUNTER — OFFICE VISIT (OUTPATIENT)
Dept: FAMILY MEDICINE | Facility: CLINIC | Age: 34
End: 2024-04-23
Payer: COMMERCIAL

## 2024-04-23 VITALS
HEART RATE: 90 BPM | BODY MASS INDEX: 30.99 KG/M2 | DIASTOLIC BLOOD PRESSURE: 82 MMHG | HEIGHT: 65 IN | TEMPERATURE: 99.1 F | OXYGEN SATURATION: 100 % | WEIGHT: 186 LBS | RESPIRATION RATE: 12 BRPM | SYSTOLIC BLOOD PRESSURE: 123 MMHG

## 2024-04-23 DIAGNOSIS — Z11.3 SCREEN FOR STD (SEXUALLY TRANSMITTED DISEASE): ICD-10-CM

## 2024-04-23 DIAGNOSIS — N92.6 IRREGULAR MENSES: ICD-10-CM

## 2024-04-23 DIAGNOSIS — Z13.1 SCREENING FOR DIABETES MELLITUS: ICD-10-CM

## 2024-04-23 DIAGNOSIS — E78.5 HYPERLIPIDEMIA LDL GOAL <130: ICD-10-CM

## 2024-04-23 DIAGNOSIS — Z00.00 ROUTINE GENERAL MEDICAL EXAMINATION AT A HEALTH CARE FACILITY: Primary | ICD-10-CM

## 2024-04-23 DIAGNOSIS — Z12.4 CERVICAL CANCER SCREENING: ICD-10-CM

## 2024-04-23 LAB
CLUE CELLS: PRESENT
TRICHOMONAS, WET PREP: ABNORMAL
WBC'S/HIGH POWER FIELD, WET PREP: ABNORMAL
YEAST, WET PREP: ABNORMAL

## 2024-04-23 PROCEDURE — G0145 SCR C/V CYTO,THINLAYER,RESCR: HCPCS | Performed by: PHYSICIAN ASSISTANT

## 2024-04-23 PROCEDURE — 87210 SMEAR WET MOUNT SALINE/INK: CPT | Performed by: PHYSICIAN ASSISTANT

## 2024-04-23 PROCEDURE — 87624 HPV HI-RISK TYP POOLED RSLT: CPT | Performed by: PHYSICIAN ASSISTANT

## 2024-04-23 PROCEDURE — 87591 N.GONORRHOEAE DNA AMP PROB: CPT | Performed by: PHYSICIAN ASSISTANT

## 2024-04-23 PROCEDURE — 99395 PREV VISIT EST AGE 18-39: CPT | Performed by: PHYSICIAN ASSISTANT

## 2024-04-23 PROCEDURE — 87491 CHLMYD TRACH DNA AMP PROBE: CPT | Performed by: PHYSICIAN ASSISTANT

## 2024-04-23 PROCEDURE — 99213 OFFICE O/P EST LOW 20 MIN: CPT | Mod: 25 | Performed by: PHYSICIAN ASSISTANT

## 2024-04-23 RX ORDER — CHOLECALCIFEROL (VITAMIN D3) 50 MCG
1 TABLET ORAL DAILY
COMMUNITY

## 2024-04-23 ASSESSMENT — PAIN SCALES - GENERAL: PAINLEVEL: NO PAIN (1)

## 2024-04-23 NOTE — PATIENT INSTRUCTIONS
Preventive Care Advice   This is general advice given by our system to help you stay healthy. However, your care team may have specific advice just for you. Please talk to your care team about your preventive care needs.  Nutrition  Eat 5 or more servings of fruits and vegetables each day.  Try wheat bread, brown rice and whole grain pasta (instead of white bread, rice, and pasta).  Get enough calcium and vitamin D. Check the label on foods and aim for 100% of the RDA (recommended daily allowance).  Lifestyle  Exercise at least 150 minutes each week   (30 minutes a day, 5 days a week).  Do muscle strengthening activities 2 days a week. These help control your weight and prevent disease.  No smoking.  Wear sunscreen to prevent skin cancer.  Have a dental exam and cleaning every 6 months.  Yearly exams  See your health care team every year to talk about:  Any changes in your health.  Any medicines your care team has prescribed.  Preventive care, family planning, and ways to prevent chronic diseases.  Shots (vaccines)   HPV shots (up to age 26), if you've never had them before.  Hepatitis B shots (up to age 59), if you've never had them before.  COVID-19 shot: Get this shot when it's due.  Flu shot: Get a flu shot every year.  Tetanus shot: Get a tetanus shot every 10 years.  Pneumococcal, hepatitis A, and RSV shots: Ask your care team if you need these based on your risk.  Shingles shot (for age 50 and up).  General health tests  Diabetes screening:  Starting at age 35, Get screened for diabetes at least every 3 years.  If you are younger than age 35, ask your care team if you should be screened for diabetes.  Cholesterol test: At age 39, start having a cholesterol test every 5 years, or more often if advised.  Bone density scan (DEXA): At age 50, ask your care team if you should have this scan for osteoporosis (brittle bones).  Hepatitis C: Get tested at least once in your life.  STIs (sexually transmitted  infections)  Before age 24: Ask your care team if you should be screened for STIs.  After age 24: Get screened for STIs if you're at risk. You are at risk for STIs (including HIV) if:  You are sexually active with more than one person.  You don't use condoms every time.  You or a partner was diagnosed with a sexually transmitted infection.  If you are at risk for HIV, ask about PrEP medicine to prevent HIV.  Get tested for HIV at least once in your life, whether you are at risk for HIV or not.  Cancer screening tests  Cervical cancer screening: If you have a cervix, begin getting regular cervical cancer screening tests at age 21. Most people who have regular screenings with normal results can stop after age 65. Talk about this with your provider.  Breast cancer scan (mammogram): If you've ever had breasts, begin having regular mammograms starting at age 40. This is a scan to check for breast cancer.  Colon cancer screening: It is important to start screening for colon cancer at age 45.  Have a colonoscopy test every 10 years (or more often if you're at risk) Or, ask your provider about stool tests like a FIT test every year or Cologuard test every 3 years.  To learn more about your testing options, visit: https://www.[email or web address]/[email or web address].  For help making a decision, visit: https://bit.ly/bb99408.  Prostate cancer screening test: If you have a prostate and are age 55 to 69, ask your provider if you would benefit from a yearly prostate cancer screening test.  Lung cancer screening: If you are a current or former smoker age 50 to 80, ask your care team if ongoing lung cancer screenings are right for you.  For informational purposes only. Not to replace the advice of your health care provider. Copyright   2023 [employer]. All rights reserved. Clinically reviewed by the [facility]. [employer] [id number] - REV 01/24.    Learning About Stress  What is stress?     Stress is your  body's response to a hard situation. Your body can have a physical, emotional, or mental response. Stress is a fact of life for most people, and it affects everyone differently. What causes stress for you may not be stressful for someone else.  A lot of things can cause stress. You may feel stress when you go on a job interview, take a test, or run a race. This kind of short-term stress is normal and even useful. It can help you if you need to work hard or react quickly. For example, stress can help you finish an important job on time.  Long-term stress is caused by ongoing stressful situations or events. Examples of long-term stress include long-term health problems, ongoing problems at work, or conflicts in your family. Long-term stress can harm your health.  How does stress affect your health?  When you are stressed, your body responds as though you are in danger. It makes hormones that speed up your heart, make you breathe faster, and give you a burst of energy. This is called the fight-or-flight stress response. If the stress is over quickly, your body goes back to normal and no harm is done.  But if stress happens too often or lasts too long, it can have bad effects. Long-term stress can make you more likely to get sick, and it can make symptoms of some diseases worse. If you tense up when you are stressed, you may develop neck, shoulder, or low back pain. Stress is linked to high blood pressure and heart disease.  Stress also harms your emotional health. It can make you nichole, tense, or depressed. Your relationships may suffer, and you may not do well at work or school.  What can you do to manage stress?  You can try these things to help manage stress:   Do something active. Exercise or activity can help reduce stress. Walking is a great way to get started. Even everyday activities such as housecleaning or yard work can help.  Try yoga or dion chi. These techniques combine exercise and meditation. You may need  some training at first to learn them.  Do something you enjoy. For example, listen to music or go to a movie. Practice your hobby or do volunteer work.  Meditate. This can help you relax, because you are not worrying about what happened before or what may happen in the future.  Do guided imagery. Imagine yourself in any setting that helps you feel calm. You can use online videos, books, or a teacher to guide you.  Do breathing exercises. For example:  From a standing position, bend forward from the waist with your knees slightly bent. Let your arms dangle close to the floor.  Breathe in slowly and deeply as you return to a standing position. Roll up slowly and lift your head last.  Hold your breath for just a few seconds in the standing position.  Breathe out slowly and bend forward from the waist.  Let your feelings out. Talk, laugh, cry, and express anger when you need to. Talking with supportive friends or family, a counselor, or a samuel leader about your feelings is a healthy way to relieve stress. Avoid discussing your feelings with people who make you feel worse.  Write. It may help to write about things that are bothering you. This helps you find out how much stress you feel and what is causing it. When you know this, you can find better ways to cope.  What can you do to prevent stress?  You might try some of these things to help prevent stress:  Manage your time. This helps you find time to do the things you want and need to do.  Get enough sleep. Your body recovers from the stresses of the day while you are sleeping.  Get support. Your family, friends, and community can make a difference in how you experience stress.  Limit your news feed. Avoid or limit time on social media or news that may make you feel stressed.  Do something active. Exercise or activity can help reduce stress. Walking is a great way to get started.  Where can you learn more?  Go to https://www.healthwise.net/patiented  Enter N032 in the  "search box to learn more about \"Learning About Stress.\"  Current as of: October 24, 2023               Content Version: 14.0    9456-1140 [employer].   Care instructions adapted under license by your healthcare professional. If you have questions about a medical condition or this instruction, always ask your healthcare professional. [employer] disclaims any warranty or liability for your use of this information.      Substance Use Disorder: Care Instructions  Overview     You can improve your life and health by stopping your use of alcohol or drugs. When you don't drink or use drugs, you may feel and sleep better. You may get along better with your family, friends, and coworkers. There are medicines and programs that can help with substance use disorder.  How can you care for yourself at home?  Here are some ways to help you stay sober and prevent relapse.  If you have been given medicine to help keep you sober or reduce your cravings, be sure to take it exactly as prescribed.  Talk to your doctor about programs that can help you stop using drugs or drinking alcohol.  Do not keep alcohol or drugs in your home.  Plan ahead. Think about what you'll say if other people ask you to drink or use drugs. Try not to spend time with people who drink or use drugs.  Use the time and money spent on drinking or drugs to do something that's important to you.  Preventing a relapse  Have a plan to deal with relapse. Learn to recognize changes in your thinking that lead you to drink or use drugs. Get help before you start to drink or use drugs again.  Try to stay away from situations, friends, or places that may lead you to drink or use drugs.  If you feel the need to drink alcohol or use drugs again, seek help right away. Call a trusted friend or family member. Some people get support from organizations such as Narcotics Anonymous or [employer] or from treatment facilities.  If you relapse, get help " as soon as you can. Some people make a plan with another person that outlines what they want that person to do for them if they relapse. The plan usually includes how to handle the relapse and who to notify in case of relapse.  Don't give up. Remember that a relapse doesn't mean that you have failed. Use the experience to learn the triggers that lead you to drink or use drugs. Then quit again. Recovery is a lifelong process. Many people have several relapses before they are able to quit for good.  Follow-up care is a key part of your treatment and safety. Be sure to make and go to all appointments, and call your doctor if you are having problems. It's also a good idea to know your test results and keep a list of the medicines you take.  When should you call for help?   Call 911  anytime you think you may need emergency care. For example, call if you or someone else:    Has overdosed or has withdrawal signs. Be sure to tell the emergency workers that you are or someone else is using or trying to quit using drugs. Overdose or withdrawal signs may include:  Losing consciousness.  Seizure.  Seeing or hearing things that aren't there (hallucinations).     Is thinking or talking about suicide or harming others.   Where to get help 24 hours a day, 7 days a week   If you or someone you know talks about suicide, self-harm, a mental health crisis, a substance use crisis, or any other kind of emotional distress, get help right away. You can:    Call the Suicide and Crisis Lifeline at 982.     Call 6-351-314-TALK (1-540.756.1057).     Text HOME to 737679 to access the Crisis Text Line.   Consider saving these numbers in your phone.  Go to [employer]org for more information or to chat online.  Call your doctor now or seek immediate medical care if:    You are having withdrawal symptoms. These may include nausea or vomiting, sweating, shakiness, and anxiety.   Watch closely for changes in your health, and be sure to contact  "your doctor if:    You have a relapse.     You need more help or support to stop.   Where can you learn more?  Go to https://www.[employer].net/patiented  Enter H573 in the search box to learn more about \"Substance Use Disorder: Care Instructions.\"  Current as of: November 15, 2023               Content Version: 14.0    7862-7135 [employer].   Care instructions adapted under license by your healthcare professional. If you have questions about a medical condition or this instruction, always ask your healthcare professional. [employer] disclaims any warranty or liability for your use of this information.      Safer Sex: Care Instructions  Overview  Safer sex is a way to reduce your risk of getting a sexually transmitted infection (STI). It can also help prevent pregnancy.  Several products can help you practice safer sex and reduce your chance of STIs. One of the best is a condom. There are internal and external condoms. You can use a special rubber sheet (dental dam) for protection during oral sex. Disposable gloves can keep your hands from touching blood, semen, or other body fluids that can carry infections.  Remember that birth control methods such as diaphragms, IUDs, foams, and birth control pills do not stop you from getting STIs.  Follow-up care is a key part of your treatment and safety. Be sure to make and go to all appointments, and call your doctor if you are having problems. It's also a good idea to know your test results and keep a list of the medicines you take.  How can you care for yourself at home?  Think about getting vaccinated to help prevent hepatitis A, hepatitis B, and human papillomavirus (HPV). They can be spread through sex.  Use a condom every time you have sex. Use an external condom, which goes on the penis. Or use an internal condom, which goes into the vagina or anus.  Make sure you use the right size external condom. A condom that's too small can break " "easily. A condom that's too big can slip off during sex.  Use a new condom each time you have sex. Be careful not to poke a hole in the condom when you open the wrapper.  Don't use an internal condom and an external condom at the same time.  Never use petroleum jelly (such as Vaseline), grease, hand lotion, baby oil, or anything with oil in it. These products can make holes in the condom.  After intercourse, hold the edge of the condom as you remove it. This will help keep semen from spilling out of the condom.  Do not have sex with anyone who has symptoms of an STI, such as sores on the genitals or mouth.  Do not drink a lot of alcohol or use drugs before sex.  Limit your sex partners. Sex with one partner who has sex only with you can reduce your risk of getting an STI.  Don't share sex toys. But if you do share them, use a condom and clean the sex toys between each use.  Talk to any partners before you have sex. Talk about what you feel comfortable with and whether you have any boundaries with sex. And find out if your partner or partners may be at risk for any STI. Keep in mind that a person may be able to spread an STI even if they do not have symptoms. You and any partners may want to get tested for STIs.  Where can you learn more?  Go to https://www.[employer].net/patiented  Enter B608 in the search box to learn more about \"Safer Sex: Care Instructions.\"  Current as of: November 27, 2023               Content Version: 14.0    8275-5931 [employer].   Care instructions adapted under license by your healthcare professional. If you have questions about a medical condition or this instruction, always ask your healthcare professional. [employer] disclaims any warranty or liability for your use of this information.      "

## 2024-04-23 NOTE — PROGRESS NOTES
"Preventive Care Visit  [facility]  OSMEL CHINO PA-C, Physician Assistant - Medical  Apr 23, 2024      Assessment & Plan     Routine general medical examination at a health care facility  Healthy  STD screening completed  Labs ordered  Continue eye and dental care  Immunizations declines tdap today.     F/U annually for wellness exam and in the interim as needed for problem visits.      Screen for STD (sexually transmitted disease)  - HIV Antigen Antibody Combo; Future  - Hepatitis C Screen Reflex to HCV RNA Quant and Genotype; Future  - Treponema Abs w Reflex to RPR and Titer; Future  - Herpes Simplex Virus 1 and 2 IgG; Future  - Chlamydia & Gonorrhea by PCR, G[facility]/Range - Clinic Collect  - Wet prep - Clinic Collect    See results documentation for follow up of this visit.       Screening for diabetes mellitus  - Hemoglobin A1c; Future    Cervical cancer screening  - Pap Screen with HPV - recommended age 30 - 65 years  - HPV Hold (Lab Only)    Irregular menses  - TSH with free T4 reflex; Future  - CBC with platelets; Future  - Testosterone, total; Future  - US Pelvic Complete with Transvaginal; Future    Hyperlipidemia LDL goal <130  - Lipid Profile (Chol, Trig, HDL, LDL calc); Future  - Basic metabolic panel  (Ca, Cl, CO2, Creat, Gluc, K, Na, BUN); Future    See results documentation for follow up of this visit.       BMI  Estimated body mass index is 30.72 kg/m  as calculated from the following:    Height as of this encounter: 1.657 m (5' 5.25\").    Weight as of this encounter: 84.4 kg (186 lb).     Counseling  Appropriate preventive services were discussed with this patient, including applicable screening as appropriate for fall prevention, nutrition, physical activity, Tobacco-use cessation, weight loss and cognition.  Checklist reviewing preventive services available has been given to the patient.  Reviewed patient's diet, addressing concerns and/or questions.   She is at risk for " "lack of exercise and has been provided with information to increase physical activity for the benefit of her well-being.   She is at risk for psychosocial distress and has been provided with information to reduce risk.           Subjective   Aniyah is a 34 year old, presenting for the following:  Physical        4/23/2024     4:42 PM   Additional Questions   Roomed by Martina   Accompanied by Self         4/23/2024     4:42 PM   Patient Reported Additional Medications   Patient reports taking the following new medications N/a        Health Care Directive  Patient does not have a Health Care Directive or Living Will: Discussed advance care planning with patient; information given to patient to review.    Aniyah is a very pleasant 34-year-old female who presents to clinic for her annual exam.  Concern she has involves \"lumps in the groin area.\"  She states that she has had a cyst rupture in the past around her underwear line and in the past 6 months that she has noticed that others will come and go.  They are not painful, but she can feel them enlarged during her cycle like her hormones are affecting it.  She has menses every 4 to 5 weeks, lasting 7 to 9 days.  She was on birth control in the past and has been off of it for 2 to 4 years.  She is sexually active and using the withdrawal method to help prevent pregnancy although she states if it happens, it happens.  Before her menses she will have a pinching sharp pain and sometimes feels that during her cycle as well.  Her mom has ovarian cysts and she wonders if she has them as well.  She would like to get more information on this and have a little workup to determine what the issue is.  She has no other concerns for today's visit.          4/22/2024   General Health   How would you rate your overall physical health? Good   Feel stress (tense, anxious, or unable to sleep) Only a little   (!) STRESS CONCERN      4/22/2024   Nutrition   Three or more servings of calcium " each day? Yes   Diet: Other   If other, please elaborate: lactose intolerance   How many servings of fruit and vegetables per day? (!) 2-3   How many sweetened beverages each day? 0-1         4/22/2024   Exercise   Days per week of moderate/strenous exercise 3 days   Average minutes spent exercising at this level 40 min         4/22/2024   Social Factors   Frequency of gathering with friends or relatives More than three times a week   Worry food won't last until get money to buy more No   Food not last or not have enough money for food? No   Do you have housing?  Yes   Are you worried about losing your housing? No   Lack of transportation? No   Unable to get utilities (heat,electricity)? No         4/22/2024   Dental   Dentist two times every year? Yes         4/2/2024   TB Screening   Were you born outside of the [place]? No         Today's PHQ-2 Score:       4/22/2024     6:25 PM   PHQ-2 ( 1999 [employer])   Q1: Little interest or pleasure in doing things 0   Q2: Feeling down, depressed or hopeless 1   PHQ-2 Score 1   Q1: Little interest or pleasure in doing things Not at all   Q2: Feeling down, depressed or hopeless Several days   PHQ-2 Score 1           4/22/2024   Substance Use   Alcohol more than 3/day or more than 7/wk No   Do you use any other substances recreationally? (!) CANNABIS PRODUCTS     Social History     Tobacco Use    Smoking status: Never    Smokeless tobacco: Never    Tobacco comments:     no smokers in household   Vaping Use    Vaping status: Never Used   Substance Use Topics    Alcohol use: Yes     Comment: Rarely    Drug use: Not Currently     Types: Marijuana           10/19/2021   LAST FHS-7 RESULTS   1st degree relative breast or ovarian cancer No   Any relative bilateral breast cancer No   Any male have breast cancer No   Any ONE woman have BOTH breast AND ovarian cancer No   Any woman with breast cancer before 50yrs No   2 or more relatives with breast AND/OR ovarian cancer No   2 or more  "relatives with breast AND/OR bowel cancer No        Mammogram Screening - Patient under 40 years of age: Routine Mammogram Screening not recommended.         4/22/2024   STI Screening   New sexual partner(s) since last STI/HIV test? (!) YES - would like STD testing     History of abnormal Pap smear: NO - age 30- 65 PAP every 3 years recommended        6/25/2019     8:39 AM 1/29/2016    12:00 AM 12/10/2012     4:15 PM   PAP / HPV   PAP (Historical) NIL  NIL  NIL            4/22/2024   Contraception/Family Planning   Questions about contraception or family planning No        Reviewed and updated as needed this visit by Provider                    Past Medical History:   Diagnosis Date    Pneumonia due to COVID-19 virus 11/12/2021     No past surgical history on file.  OB History   No obstetric history on file.     BP Readings from Last 3 Encounters:   04/23/24 123/82   11/10/23 116/76   11/06/23 120/82    Wt Readings from Last 3 Encounters:   04/23/24 84.4 kg (186 lb)   11/10/23 87.5 kg (193 lb)   11/06/23 87.5 kg (193 lb)              Review of Systems  Constitutional, HEENT, cardiovascular, pulmonary, GI, , musculoskeletal, neuro, skin, endocrine and psych systems are negative, except as otherwise noted.       Objective    Exam  /82   Pulse 90   Temp 99.1  F (37.3  C) (Tympanic)   Resp 12   Ht 1.657 m (5' 5.25\")   Wt 84.4 kg (186 lb)   LMP 04/12/2024   SpO2 100%   BMI 30.72 kg/m     Estimated body mass index is 30.72 kg/m  as calculated from the following:    Height as of this encounter: 1.657 m (5' 5.25\").    Weight as of this encounter: 84.4 kg (186 lb).    Physical Exam  Vitals reviewed.   Constitutional:       Appearance: Normal appearance. She is well-developed.   HENT:      Head: Normocephalic and atraumatic.      Jaw: No trismus.      Right Ear: Tympanic membrane, ear canal and external ear normal.      Left Ear: Tympanic membrane, ear canal and external ear normal.      Nose: Nose normal. No " rhinorrhea.      Mouth/Throat:      Mouth: Mucous membranes are moist.      Dentition: Normal dentition.      Tongue: No lesions. Tongue does not deviate from midline.      Pharynx: Oropharynx is clear. Uvula midline.      Tonsils: No tonsillar exudate. 2+ on the right. 2+ on the left.   Eyes:      General: Lids are normal. No allergic shiner.     Extraocular Movements: Extraocular movements intact.      Conjunctiva/sclera: Conjunctivae normal.   Neck:      Thyroid: No thyroid mass, thyromegaly or thyroid tenderness.      Trachea: Trachea normal.   Cardiovascular:      Rate and Rhythm: Normal rate and regular rhythm.      Pulses:           Posterior tibial pulses are 2+ on the right side and 2+ on the left side.      Heart sounds: Normal heart sounds. No murmur heard.  Pulmonary:      Effort: Pulmonary effort is normal.      Breath sounds: Normal breath sounds.   Abdominal:      General: Abdomen is flat. Bowel sounds are normal.      Palpations: Abdomen is soft.      Tenderness: There is no abdominal tenderness.      Hernia: No hernia is present.   Genitourinary:     General: Normal vulva.      Vagina: Normal.      Cervix: Normal.      Rectum: Normal.   Musculoskeletal:      Cervical back: Normal range of motion.      Right lower leg: No edema.      Left lower leg: No edema.      Comments: Ambulatory without assistive device  Limbs with grossly normal AROM   Lymphadenopathy:      Head:      Right side of head: No submental, submandibular, tonsillar, preauricular or posterior auricular adenopathy.      Left side of head: No submental, submandibular, tonsillar, preauricular or posterior auricular adenopathy.      Cervical: No cervical adenopathy.      Upper Body:      Right upper body: No supraclavicular adenopathy.   Skin:     General: Skin is warm and dry.      Capillary Refill: Capillary refill takes 2 to 3 seconds.      Findings: No lesion, rash or wound.   Neurological:      General: No focal deficit present.       Mental Status: She is alert.      Motor: Motor function is intact.      Coordination: Coordination is intact.      Gait: Gait is intact.      Deep Tendon Reflexes:      Reflex Scores:       Patellar reflexes are 2+ on the right side and 2+ on the left side.     Comments: CN II-XII grossly intact   Psychiatric:         Attention and Perception: Attention normal.         Mood and Affect: Mood normal.         Behavior: Behavior is cooperative.         Thought Content: Thought content normal.         Signed Electronically by: OSMEL CHINO PA-C

## 2024-04-24 DIAGNOSIS — B96.89 BACTERIAL VAGINOSIS: Primary | ICD-10-CM

## 2024-04-24 DIAGNOSIS — N76.0 BACTERIAL VAGINOSIS: Primary | ICD-10-CM

## 2024-04-24 LAB
C TRACH DNA SPEC QL PROBE+SIG AMP: NEGATIVE
N GONORRHOEA DNA SPEC QL NAA+PROBE: NEGATIVE

## 2024-04-24 RX ORDER — METRONIDAZOLE 500 MG/1
500 TABLET ORAL 2 TIMES DAILY
Qty: 14 TABLET | Refills: 0 | Status: SHIPPED | OUTPATIENT
Start: 2024-04-24 | End: 2024-05-01

## 2024-04-26 LAB
BKR LAB AP GYN ADEQUACY: NORMAL
BKR LAB AP GYN INTERPRETATION: NORMAL
BKR LAB AP HPV REFLEX: NORMAL
BKR LAB AP LMP: NORMAL
BKR LAB AP PREVIOUS ABNORMAL: NORMAL
PATH REPORT.COMMENTS IMP SPEC: NORMAL
PATH REPORT.COMMENTS IMP SPEC: NORMAL
PATH REPORT.RELEVANT HX SPEC: NORMAL

## 2024-04-29 ENCOUNTER — ANCILLARY PROCEDURE (OUTPATIENT)
Dept: ULTRASOUND IMAGING | Facility: CLINIC | Age: 34
End: 2024-04-29
Attending: PHYSICIAN ASSISTANT
Payer: COMMERCIAL

## 2024-04-29 DIAGNOSIS — N92.6 IRREGULAR MENSES: ICD-10-CM

## 2024-04-29 LAB
HUMAN PAPILLOMA VIRUS 16 DNA: NEGATIVE
HUMAN PAPILLOMA VIRUS 18 DNA: NEGATIVE
HUMAN PAPILLOMA VIRUS FINAL DIAGNOSIS: ABNORMAL
HUMAN PAPILLOMA VIRUS OTHER HR: POSITIVE

## 2024-04-29 PROCEDURE — 76856 US EXAM PELVIC COMPLETE: CPT | Mod: TC | Performed by: RADIOLOGY

## 2024-04-29 PROCEDURE — 76830 TRANSVAGINAL US NON-OB: CPT | Mod: TC | Performed by: RADIOLOGY

## 2024-04-30 ENCOUNTER — PATIENT OUTREACH (OUTPATIENT)
Dept: FAMILY MEDICINE | Facility: CLINIC | Age: 34
End: 2024-04-30
Payer: COMMERCIAL

## 2024-04-30 DIAGNOSIS — N83.8 OVARIAN MASS: Primary | ICD-10-CM

## 2024-04-30 PROBLEM — R87.810 CERVICAL HIGH RISK HPV (HUMAN PAPILLOMAVIRUS) TEST POSITIVE: Status: ACTIVE | Noted: 2024-04-23

## 2024-05-02 ENCOUNTER — LAB (OUTPATIENT)
Dept: LAB | Facility: OTHER | Age: 34
End: 2024-05-02
Payer: COMMERCIAL

## 2024-05-02 DIAGNOSIS — N92.6 IRREGULAR MENSES: ICD-10-CM

## 2024-05-02 DIAGNOSIS — E78.5 HYPERLIPIDEMIA LDL GOAL <130: ICD-10-CM

## 2024-05-02 DIAGNOSIS — Z13.1 SCREENING FOR DIABETES MELLITUS: ICD-10-CM

## 2024-05-02 DIAGNOSIS — Z11.3 SCREEN FOR STD (SEXUALLY TRANSMITTED DISEASE): ICD-10-CM

## 2024-05-02 LAB
ALBUMIN SERPL BCG-MCNC: 4.4 G/DL (ref 3.5–5.2)
ALP SERPL-CCNC: 60 U/L (ref 40–150)
ALT SERPL W P-5'-P-CCNC: 10 U/L (ref 0–50)
ANION GAP SERPL CALCULATED.3IONS-SCNC: 11 MMOL/L (ref 7–15)
AST SERPL W P-5'-P-CCNC: 13 U/L (ref 0–45)
BILIRUB SERPL-MCNC: 0.6 MG/DL
BUN SERPL-MCNC: 8.5 MG/DL (ref 6–20)
CALCIUM SERPL-MCNC: 9.1 MG/DL (ref 8.6–10)
CHLORIDE SERPL-SCNC: 104 MMOL/L (ref 98–107)
CHOLEST SERPL-MCNC: 159 MG/DL
CREAT SERPL-MCNC: 0.82 MG/DL (ref 0.51–0.95)
DEPRECATED HCO3 PLAS-SCNC: 24 MMOL/L (ref 22–29)
EGFRCR SERPLBLD CKD-EPI 2021: >90 ML/MIN/1.73M2
ERYTHROCYTE [DISTWIDTH] IN BLOOD BY AUTOMATED COUNT: 12.1 % (ref 10–15)
FASTING STATUS PATIENT QL REPORTED: YES
GLUCOSE SERPL-MCNC: 105 MG/DL (ref 70–99)
HBA1C MFR BLD: 5.2 % (ref 0–5.6)
HCT VFR BLD AUTO: 39.3 % (ref 35–47)
HDLC SERPL-MCNC: 45 MG/DL
HGB BLD-MCNC: 13 G/DL (ref 11.7–15.7)
HSV1 IGG SERPL QL IA: 0.34 INDEX
HSV1 IGG SERPL QL IA: NORMAL
HSV2 IGG SERPL QL IA: 0.12 INDEX
HSV2 IGG SERPL QL IA: NORMAL
LDLC SERPL CALC-MCNC: 102 MG/DL
MCH RBC QN AUTO: 29.6 PG (ref 26.5–33)
MCHC RBC AUTO-ENTMCNC: 33.1 G/DL (ref 31.5–36.5)
MCV RBC AUTO: 90 FL (ref 78–100)
NONHDLC SERPL-MCNC: 114 MG/DL
PLATELET # BLD AUTO: 287 10E3/UL (ref 150–450)
POTASSIUM SERPL-SCNC: 3.9 MMOL/L (ref 3.4–5.3)
PROT SERPL-MCNC: 7.4 G/DL (ref 6.4–8.3)
RBC # BLD AUTO: 4.39 10E6/UL (ref 3.8–5.2)
SODIUM SERPL-SCNC: 139 MMOL/L (ref 135–145)
T PALLIDUM AB SER QL: NONREACTIVE
T4 FREE SERPL-MCNC: 0.95 NG/DL (ref 0.9–1.7)
TRIGL SERPL-MCNC: 61 MG/DL
TSH SERPL DL<=0.005 MIU/L-ACNC: 4.89 UIU/ML (ref 0.3–4.2)
WBC # BLD AUTO: 4.7 10E3/UL (ref 4–11)

## 2024-05-02 PROCEDURE — 84403 ASSAY OF TOTAL TESTOSTERONE: CPT

## 2024-05-02 PROCEDURE — 86780 TREPONEMA PALLIDUM: CPT

## 2024-05-02 PROCEDURE — 86695 HERPES SIMPLEX TYPE 1 TEST: CPT

## 2024-05-02 PROCEDURE — 83036 HEMOGLOBIN GLYCOSYLATED A1C: CPT

## 2024-05-02 PROCEDURE — 86696 HERPES SIMPLEX TYPE 2 TEST: CPT

## 2024-05-02 PROCEDURE — 80061 LIPID PANEL: CPT

## 2024-05-02 PROCEDURE — 84443 ASSAY THYROID STIM HORMONE: CPT

## 2024-05-02 PROCEDURE — 80053 COMPREHEN METABOLIC PANEL: CPT

## 2024-05-02 PROCEDURE — 85027 COMPLETE CBC AUTOMATED: CPT

## 2024-05-02 PROCEDURE — 87389 HIV-1 AG W/HIV-1&-2 AB AG IA: CPT

## 2024-05-02 PROCEDURE — 36415 COLL VENOUS BLD VENIPUNCTURE: CPT

## 2024-05-02 PROCEDURE — 84439 ASSAY OF FREE THYROXINE: CPT

## 2024-05-02 PROCEDURE — 86803 HEPATITIS C AB TEST: CPT

## 2024-05-03 LAB
HCV AB SERPL QL IA: NONREACTIVE
HIV 1+2 AB+HIV1 P24 AG SERPL QL IA: NONREACTIVE

## 2024-05-05 LAB — TESTOST SERPL-MCNC: 18 NG/DL (ref 8–60)

## 2024-05-07 NOTE — PROGRESS NOTES
Assessment & Plan     Ovarian mass  We discussed her ultrasound report as well as preliminary MRI result-discussed would need to wait for final report before we can be absolutely definitive.  Ultrasound done few days prior to menses onset, MRI now at tail end of menses. Discussed the ovarian mass noted on ultrasound seems to have resolved on MRI, discussed possible etiologies. Explained the difference between functional cysts, benign and malignant cystic tumors. Discussed the increased concern associated with increasing complexity. Discussed the risk of rupture and torsion associated with the different types and sizes of cysts. Patient is given an opportunity to ask questions and have them answered.  - Ob/Gyn  Referral    Irregular menses  We discussed her irregular menstrual cycles-overall fall into normal pattern, 2 longer cycles recently with longest being 42 days. May be related to elevated TSH-will be rechecking in few months. For now, declines intervention and prefers to remain off hormonal medications.  - [place] PELVIC EXAMINATION    Vulvar cyst  We discussed her vulvar/groin cysts-no current signs of abscess and no signs of needing I&D. Discussed possibility of the cysts causing inguinal lymph node enlargement noted on preliminary MRI-though no node is palpable at this time. We discussed use of heat, symptoms of abscess formation and when excision could be indicated. Patient is given an opportunity to ask questions and have them answered.    47 minutes spent by me on the date of the encounter doing chart review, history and exam, documentation and further activities per the note      Subjective   Aniyah is a 34 year old, presenting for the following health issues:  Consult (Indeterminant solid mass in the left ovary)    HPI       Consult- Indeterminant solid mass in the left ovary     Patient had her preventive visit with primary care a few weeks ago. Had discussed concerns of a pinching sharp pain  "prior to menses, mom with history of ovarian cysts. Also had discussed menstrual cycles that were somewhat longer between wit longest cycle being about 42 days. Not actively attempting pregnancy, but open if one occurs. Labs done few days ago do show slightly elevated TSH, normal Free T4 with follow up planned. Normal testosterone level. CBC normal.  History of irregular menses prior to use of oral contraceptive pill-which she started around age 20. Has been off for a while now and cycles are more regular overall than as a teenager. Due to all these concerns, pelvic ultrasound was ordered.    Additionally, at that visit, patient had mentioned excision of a sebaceous cyst in the left groin area-on the underwear line last fall. Continues to notes \"lumps\" that come and go in the groin and vulva area-not in the same location as the previous excision. Currently notes one on the left vulva-soft, mildly tender and another on the mons area-more deep and not tender.     EXAM: US PELVIC TRANSABDOMINAL AND TRANSVAGINAL  LOCATION: [facility] [place]  DATE: 4/29/2024     INDICATION:  Irregular menses  COMPARISON: None.  TECHNIQUE: Transabdominal scans were performed. Endovaginal ultrasound was performed to better visualize the adnexa.     FINDINGS:     UTERUS: 10.0 x 4.0 x 5.0 cm. Normal in size and position with no masses.     ENDOMETRIUM: 9 mm. Normal smooth endometrium.     RIGHT OVARY: 3.4 x 2.6 x 2.0 cm. Documented vascular flow.     LEFT OVARY: 3.7 x 3.0 x 2.1 cm. Documented vascular flow. Within the left ovary is an isoechoic mass with internal vascular flow measuring approximately 1.8 x 1.7 x 1.6 cm. Neoplasm cannot be excluded.     No significant free fluid.                                                                      IMPRESSION:  1.  Indeterminant solid mass in the left ovary measuring approximately 1.8 x 1.7 x 1.6 cm (images 64-67 are representative). Gynecologic consultation is recommended. A " "pelvic MRI may be beneficial to better characterize.  2.  Remainder negative as visualized as detailed above.    Patient completed pelvic MRI just prior to visit today. At time of her appointment, preliminary result was available, not finalized at this time.     Preliminary report:    MR PELVIS (GYN) WITHOUT AND WITH CONTRAST   5/8/2024 10:58 AM      HISTORY: Ovarian mass.     TECHNIQUE: Multiplanar, multiecho MRI was performed using T1, T2, and  in- and out-of-phase imaging. Pre- and postcontrast T1 images were  acquired after the administration of 8.5 mL Gadavist IV.      COMPARISON: Ultrasound 4/29/2024.     FINDINGS:   UTERUS: Normal positioning. Uterus measures 4.4 x 3.9 x 8.5 cm. No  focal worrisome myometrial lesion. Normal endometrium size of 5 mm.  Normal junctional zone. No visible cervical abnormality.     ADNEXA: No worrisome right or left ovarian lesion. Normal-appearing  bilateral ovarian follicles after contrast administration. No  worrisome ovarian enhancement identified.     ADDITIONAL FINDINGS: Borderline prominent bilateral inguinal lymph  nodes measuring up to 11 mm on the left, series 16 image 62. Trace  pelvic free fluid.     MUSCULOSKELETAL: Normal.                                                                      IMPRESSION:   1.  No worrisome left or right ovarian lesion. The previously noted  left ovarian nodule is not identified. This could have represented a  decompressing functional cyst.  2.  Borderline prominent left inguinal lymph node of questionable  significance.   This result has not been signed. Information might be incomplete.      Review of Systems  Constitutional, HEENT, cardiovascular, pulmonary, gi and gu systems are negative, except as otherwise noted.      Objective    /83 (BP Location: Right arm, Patient Position: Sitting, Cuff Size: Adult Large)   Pulse 87   Ht 1.657 m (5' 5.25\")   Wt 84.4 kg (186 lb)   LMP 05/03/2024 (Exact Date)   SpO2 96%   BMI 30.72 " kg/m    Body mass index is 30.72 kg/m .  Physical Exam   GENERAL: alert and no distress   (female): external genitalia-on the left vulvar area is a slightly erythematous, soft and mildly tender inclusion cyst. No induration or fluctuation. Second cyst is noted somewhat deeper under the skin in the left mons area-not tender. Normal urethral meatus , normal vaginal mucosa, and normal cervix, adnexae, and uterus without masses. No palpable inguinal lymph nodes.  MS: no gross musculoskeletal defects noted, no edema  SKIN: no suspicious lesions or rashes except see   PSYCH: mentation appears normal, affect normal/bright    Signed Electronically by: LISA Kim CNP

## 2024-05-08 ENCOUNTER — OFFICE VISIT (OUTPATIENT)
Dept: OBGYN | Facility: CLINIC | Age: 34
End: 2024-05-08
Attending: PHYSICIAN ASSISTANT
Payer: COMMERCIAL

## 2024-05-08 ENCOUNTER — ANCILLARY PROCEDURE (OUTPATIENT)
Dept: MRI IMAGING | Facility: CLINIC | Age: 34
End: 2024-05-08
Attending: PHYSICIAN ASSISTANT
Payer: COMMERCIAL

## 2024-05-08 VITALS
HEART RATE: 87 BPM | SYSTOLIC BLOOD PRESSURE: 120 MMHG | DIASTOLIC BLOOD PRESSURE: 83 MMHG | OXYGEN SATURATION: 96 % | BODY MASS INDEX: 30.99 KG/M2 | WEIGHT: 186 LBS | HEIGHT: 65 IN

## 2024-05-08 DIAGNOSIS — N83.8 OVARIAN MASS: ICD-10-CM

## 2024-05-08 DIAGNOSIS — N90.7 VULVAR CYST: ICD-10-CM

## 2024-05-08 DIAGNOSIS — N92.6 IRREGULAR MENSES: Primary | ICD-10-CM

## 2024-05-08 PROCEDURE — A9585 GADOBUTROL INJECTION: HCPCS | Performed by: RADIOLOGY

## 2024-05-08 PROCEDURE — 72197 MRI PELVIS W/O & W/DYE: CPT | Mod: TC | Performed by: RADIOLOGY

## 2024-05-08 PROCEDURE — 99204 OFFICE O/P NEW MOD 45 MIN: CPT | Performed by: NURSE PRACTITIONER

## 2024-05-08 PROCEDURE — 99459 PELVIC EXAMINATION: CPT | Performed by: NURSE PRACTITIONER

## 2024-05-08 RX ORDER — GADOBUTROL 604.72 MG/ML
8.5 INJECTION INTRAVENOUS ONCE
Status: COMPLETED | OUTPATIENT
Start: 2024-05-08 | End: 2024-05-08

## 2024-05-08 RX ADMIN — GADOBUTROL 8.5 ML: 604.72 INJECTION INTRAVENOUS at 10:38

## 2024-05-08 NOTE — PATIENT INSTRUCTIONS
If you have any questions regarding your visit, Please contact your care team.     [employer]: [phone]  To Schedule an Appointment 24/7  Call: [phone]LE[facility]     Celeste Gee- APRN CNP      Estuardo Soto-Surgery Scheduler  Zaira-Surgery Scheduler         Monday 7:30am-2:00pm    Tuesday 7:30am-4:00pm    Wednesday 7:30am-2:00pm    Thursday 7:30am-11:00am    Friday 7:30am-2:00pm [address] [address]  Phone- [phone]   Fax- [phone]     Imaging Scheduling all locations  [phone]    [facility]  [address] [place] [address]   [place] [address] [phone]         Urgent Care locations:  [facility]   Monday-Friday  10 am - 8 pm  Saturday and Sunday   9 am - 5 pm     [phone]   If you need a medication refill, please contact your pharmacy. Please allow 3 business days for your refill to be completed.  As always, Thank you for trusting us with your healthcare needs!      see additional instructions from your care team below

## 2024-05-09 NOTE — PROGRESS NOTES
ADDENDUM: Final MRI report available (5/9/24): At this time, no further follow up needed on ovarian mass as this has resolved. Had discussed with patient at visit that I would keep eye out for result and notify her if final report changes recommendations we discussed at her visit. Celeste GONZALEZ CNP

## 2024-05-15 ENCOUNTER — DOCUMENTATION ONLY (OUTPATIENT)
Dept: LAB | Facility: CLINIC | Age: 34
End: 2024-05-15
Payer: COMMERCIAL

## 2024-05-15 DIAGNOSIS — R79.89 ELEVATED TSH: Primary | ICD-10-CM

## 2024-05-15 NOTE — PROGRESS NOTES
Aniyah Evans has an upcoming lab appointment:    Future Appointments   Date Time Provider Department Center   5/30/2024  7:30 AM AN LAB [facility] [facility]     Patient is scheduled for the following lab(s): Repeat fasting labs    There is no order available. Please review and place either future orders or HMPO (Review of Health Maintenance Protocol Orders), as appropriate.    Health Maintenance Due   Topic    ANNUAL REVIEW OF HM ORDERS      Shannan Kaminski

## 2024-09-30 ENCOUNTER — TELEPHONE (OUTPATIENT)
Dept: OBGYN | Facility: CLINIC | Age: 34
End: 2024-09-30
Payer: COMMERCIAL

## 2024-09-30 NOTE — TELEPHONE ENCOUNTER
Patient last seen 5/8/24 with Celeste for Irregular Menses/Ovarian mass. Patient requesting virtual visit with Celeste to discuss contraception options.   Routing to provider to review & advise.    Maegan Mcakenzie MA  9/30/2024 10:35 AM

## 2024-09-30 NOTE — TELEPHONE ENCOUNTER
[facility] Call Center    Phone Message    May a detailed message be left on voicemail: yes     Reason for Call: Pt would like to schedule a video visit to discuss best contraception option for pt.   Per procedure, needing approval for video visit.  Please call to schedule if ok. Thank you    Action Taken: Message routed to:  Other: AN OBGYN    Travel Screening: Not Applicable    DISPLAY PLAN FREE TEXT

## 2024-10-02 NOTE — PROGRESS NOTES
Aniyah is a 34 year old who is being evaluated via a billable video visit.    How would you like to obtain your AVS? MyChart  If the video visit is dropped, the invitation should be resent by: Text to cell phone: [phone]  Will anyone else be joining your video visit? No      Assessment & Plan     Irregular menses  We discussed her cycles, vulvar/groin cysts and the recurrence related to her cycles. Patient prefers to try a progesterone only instead of a combination method. We discussed Micronor, Depo Provera, Nexplanon and progesterone IUD. Briefly discussed combination methods. Patient would like to start with Micronor, if this does not impact the occurrence of cysts, she may opt to change to a low dose combined oral contraceptive pill. We did discuss the possible bleeding patterns with Micronor and she would have better suppression with other progesterone option, but desires to start with pills first. Likely not wanting anything invasive/longer term like IUD or Depo Provera and does not desire Nexplanon. Could also consider change to Norethindrone 5mg daily if Micronor not successful. Patient is given an opportunity to ask questions and have them answered. Discussed when to start pills, possible side effects, taking it regularly and back up method.   - norethindrone (MICRONOR) 0.35 MG tablet; Take 1 tablet (0.35 mg) by mouth daily.    Subjective   Aniyah is a 34 year old, presenting for the following health issues:  Contraception    HPI     Contraception    Patient seen earlier this year and we discussed somewhat irregular menstrual cycles, at that time no intervention was desired. Lately, they have been occurring about every 3.5 weeks, lasting 6 days.  Patient is in a relationship, partner wants to wait on intercourse, so not necessarily using for contraception, but may be used for it down the road. Also, continues to get the vulvar/groin cysts and they way they are coming make her wonder about a hormonal  component. Has had no interval changes in her history. Patient is leaning towards a progesterone only therapy. Ideally wants to not do a pill daily, but is amenable to one if that is the best option. May consider pregnancy down the road, so ideally not anything that could delay a return to her fertility.  No contraindications to use of hormonal medications.     Review of Systems  Constitutional, HEENT, cardiovascular, pulmonary, gi and gu systems are negative, except as otherwise noted.      Objective           Vitals:  No vitals were obtained today due to virtual visit.    Physical Exam   GENERAL: alert and no distress  EYES: Eyes grossly normal to inspection.  No discharge or erythema, or obvious scleral/conjunctival abnormalities.  RESP: No audible wheeze, cough, or visible cyanosis.    SKIN: Visible skin clear. No significant rash, abnormal pigmentation or lesions.  NEURO: Cranial nerves grossly intact.  Mentation and speech appropriate for age.  PSYCH: Appropriate affect, tone, and pace of words        Video-Visit Details    Type of service:  Video Visit   Originating Location (pt. Location): Home    Distant Location (provider location):  On-site  Platform used for Video Visit: [facility]  Visit time: 15 minutes    Signed Electronically by: LISA Kim CNP

## 2024-10-07 ENCOUNTER — VIRTUAL VISIT (OUTPATIENT)
Dept: OBGYN | Facility: CLINIC | Age: 34
End: 2024-10-07
Payer: COMMERCIAL

## 2024-10-07 DIAGNOSIS — N92.6 IRREGULAR MENSES: Primary | ICD-10-CM

## 2024-10-07 PROCEDURE — 99214 OFFICE O/P EST MOD 30 MIN: CPT | Mod: 95 | Performed by: NURSE PRACTITIONER

## 2024-10-07 RX ORDER — ACETAMINOPHEN AND CODEINE PHOSPHATE 120; 12 MG/5ML; MG/5ML
0.35 SOLUTION ORAL DAILY
Qty: 84 TABLET | Refills: 3 | Status: SHIPPED | OUTPATIENT
Start: 2024-10-07

## 2024-10-07 NOTE — PATIENT INSTRUCTIONS
If you have any questions regarding your visit, Please contact your care team.     [employer]: [phone]  To Schedule an Appointment 24/7  Call: [phone] TELEPHONE [facility]     Celeste Gee- APRN CNP      Estuardo Soto-Surgery Scheduler  Zaira-Surgery Scheduler         Monday 7:30am-2:00pm    Tuesday 7:30am-4:00pm    Wednesday 7:30am-2:00pm    Thursday 7:30am-11:00am    Friday 7:30am-2:00pm [address] [address]  Phone- [phone]   Fax- [phone]     Imaging Scheduling all locations  [phone]    [facility]  [address] [place] [address]   [place] [address] [phone]         Urgent Care locations:  [facility]   Monday-Friday  10 am - 8 pm  Saturday and Sunday   9 am - 5 pm     [phone]   If you need a medication refill, please contact your pharmacy. Please allow 3 business days for your refill to be completed.  As always, Thank you for trusting us with your healthcare needs!      see additional instructions from your care team below

## 2024-10-29 ENCOUNTER — TELEPHONE (OUTPATIENT)
Dept: FAMILY MEDICINE | Facility: OTHER | Age: 34
End: 2024-10-29
Payer: COMMERCIAL

## 2024-10-29 NOTE — TELEPHONE ENCOUNTER
S-(situation): Patient calling with concern for white spot on tonsils.     B-(background): She is prone to strep throat infections.     A-(assessment): Patient had 1 white spot on each tonsil yesterday, today there are 2 white spots on each tonsil. She just started having congestion. She denies sore/scratchy throat. She says her tonsils are not inflamed or swollen. She started her menstrual cycle and says her fatigue is associated to that. Patient denies fever and other symptoms.     R-(recommendations): Triage RN Advised patient to submit E-visit. Patient agrees to submit E-visit for Strep throat.     Olvin Blackwell RN on 10/29/2024 at 1:12 PM

## 2024-10-30 ENCOUNTER — TRANSFERRED RECORDS (OUTPATIENT)
Dept: HEALTH INFORMATION MANAGEMENT | Facility: CLINIC | Age: 34
End: 2024-10-30
Payer: COMMERCIAL

## 2025-02-04 SDOH — HEALTH STABILITY: PHYSICAL HEALTH: ON AVERAGE, HOW MANY MINUTES DO YOU ENGAGE IN EXERCISE AT THIS LEVEL?: 40 MIN

## 2025-02-04 SDOH — HEALTH STABILITY: PHYSICAL HEALTH: ON AVERAGE, HOW MANY DAYS PER WEEK DO YOU ENGAGE IN MODERATE TO STRENUOUS EXERCISE (LIKE A BRISK WALK)?: 3 DAYS

## 2025-02-04 ASSESSMENT — SOCIAL DETERMINANTS OF HEALTH (SDOH): HOW OFTEN DO YOU GET TOGETHER WITH FRIENDS OR RELATIVES?: TWICE A WEEK

## 2025-02-07 ENCOUNTER — OFFICE VISIT (OUTPATIENT)
Dept: FAMILY MEDICINE | Facility: CLINIC | Age: 35
End: 2025-02-07
Payer: COMMERCIAL

## 2025-02-07 ENCOUNTER — MYC MEDICAL ADVICE (OUTPATIENT)
Dept: FAMILY MEDICINE | Facility: CLINIC | Age: 35
End: 2025-02-07

## 2025-02-07 VITALS
RESPIRATION RATE: 20 BRPM | SYSTOLIC BLOOD PRESSURE: 131 MMHG | BODY MASS INDEX: 29.89 KG/M2 | DIASTOLIC BLOOD PRESSURE: 81 MMHG | OXYGEN SATURATION: 100 % | WEIGHT: 186 LBS | TEMPERATURE: 97.8 F | HEIGHT: 66 IN | HEART RATE: 76 BPM

## 2025-02-07 DIAGNOSIS — Z11.3 SCREEN FOR STD (SEXUALLY TRANSMITTED DISEASE): Primary | ICD-10-CM

## 2025-02-07 DIAGNOSIS — L98.9 SCALP LESION: Primary | ICD-10-CM

## 2025-02-07 PROCEDURE — 99212 OFFICE O/P EST SF 10 MIN: CPT | Performed by: PHYSICIAN ASSISTANT

## 2025-02-07 ASSESSMENT — PAIN SCALES - GENERAL: PAINLEVEL_OUTOF10: NO PAIN (0)

## 2025-02-07 NOTE — PROGRESS NOTES
"  Assessment & Plan     Scalp lesion  Benign appearing. Offered derm referral but pt declined. States it does not bother her.     Follow up in the spring for annual exam and repeat PAP. Lab orders placed today. They should not be billed to this visit.     BMI  Estimated body mass index is 30.48 kg/m  as calculated from the following:    Height as of this encounter: 1.664 m (5' 5.5\").    Weight as of this encounter: 84.4 kg (186 lb).     Counseling  Appropriate preventive services were addressed with this patient via screening, questionnaire, or discussion as appropriate for fall prevention, nutrition, physical activity, Tobacco-use cessation, social engagement, weight loss and cognition.  Checklist reviewing preventive services available has been given to the patient.  Reviewed patient's diet, addressing concerns and/or questions.   She is at risk for lack of exercise and has been provided with information to increase physical activity for the benefit of her well-being.   She is at risk for psychosocial distress and has been provided with information to reduce risk.           Subjective   Aniyah is a 34 year old, presenting for the following health issues:  Mole (Top of head) and STD (recheck)      2/7/2025     9:49 AM   Additional Questions   Roomed by adalid   Accompanied by self         2/7/2025     9:49 AM   Patient Reported Additional Medications   Patient reports taking the following new medications see chart     Aniyah is a very pleasant 34 year old female who presents to clinic for evaluation of a mole on the top of her head. Her  whom she has seen for years mentioned that she should get that checked out. It does not bother her.    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 Therefore, she would like to wait until then to address that because she needs insurance to cover testing.      STD       Review of Systems  As per HPI      "   Objective    There were no vitals taken for this visit.  There is no height or weight on file to calculate BMI.  Physical Exam  Vitals reviewed.   Constitutional:       Appearance: Normal appearance.   Pulmonary:      Effort: Pulmonary effort is normal.   Skin:            Comments: Left top of the head (parietal area) is a raised, soft regular round acrochordon type lesion without telangectasias and with normal flesh color. Non-tender to palpation.    Neurological:      General: No focal deficit present.      Mental Status: She is alert and oriented to person, place, and time.   Psychiatric:         Mood and Affect: Mood is anxious.         Behavior: Behavior normal.         Thought Content: Thought content normal.         Judgment: Judgment normal.      Comments: Does not like coming to clinic.          Signed Electronically by: OSMEL CHINO PA-C

## 2025-04-09 PROBLEM — R87.810 CERVICAL HIGH RISK HPV (HUMAN PAPILLOMAVIRUS) TEST POSITIVE: Status: ACTIVE | Noted: 2024-04-23

## 2025-04-22 ENCOUNTER — LAB (OUTPATIENT)
Dept: LAB | Facility: CLINIC | Age: 35
End: 2025-04-22
Payer: COMMERCIAL

## 2025-04-22 DIAGNOSIS — Z11.3 SCREEN FOR STD (SEXUALLY TRANSMITTED DISEASE): ICD-10-CM

## 2025-04-22 DIAGNOSIS — R79.89 ELEVATED TSH: ICD-10-CM

## 2025-04-22 LAB
C TRACH DNA SPEC QL PROBE+SIG AMP: NEGATIVE
CLUE CELLS: NORMAL
ERYTHROCYTE [DISTWIDTH] IN BLOOD BY AUTOMATED COUNT: 11.8 % (ref 10–15)
HCT VFR BLD AUTO: 38.6 % (ref 35–47)
HGB BLD-MCNC: 12.9 G/DL (ref 11.7–15.7)
HIV 1+2 AB+HIV1 P24 AG SERPL QL IA: NONREACTIVE
HSV1 IGG SERPL QL IA: 0.33 INDEX
HSV1 IGG SERPL QL IA: NORMAL
HSV2 IGG SERPL QL IA: 0.07 INDEX
HSV2 IGG SERPL QL IA: NORMAL
MCH RBC QN AUTO: 29.7 PG (ref 26.5–33)
MCHC RBC AUTO-ENTMCNC: 33.4 G/DL (ref 31.5–36.5)
MCV RBC AUTO: 89 FL (ref 78–100)
N GONORRHOEA DNA SPEC QL NAA+PROBE: NEGATIVE
PLATELET # BLD AUTO: 264 10E3/UL (ref 150–450)
RBC # BLD AUTO: 4.34 10E6/UL (ref 3.8–5.2)
SPECIMEN TYPE: NORMAL
T PALLIDUM AB SER QL: NONREACTIVE
TRICHOMONAS, WET PREP: NORMAL
WBC # BLD AUTO: 4.8 10E3/UL (ref 4–11)
WBC'S/HIGH POWER FIELD, WET PREP: NORMAL
YEAST, WET PREP: NORMAL

## 2025-04-22 PROCEDURE — 86696 HERPES SIMPLEX TYPE 2 TEST: CPT

## 2025-04-22 PROCEDURE — 80061 LIPID PANEL: CPT

## 2025-04-22 PROCEDURE — 86376 MICROSOMAL ANTIBODY EACH: CPT

## 2025-04-22 PROCEDURE — 86780 TREPONEMA PALLIDUM: CPT

## 2025-04-22 PROCEDURE — 84439 ASSAY OF FREE THYROXINE: CPT

## 2025-04-22 PROCEDURE — 82947 ASSAY GLUCOSE BLOOD QUANT: CPT

## 2025-04-22 PROCEDURE — 86803 HEPATITIS C AB TEST: CPT

## 2025-04-22 PROCEDURE — 87210 SMEAR WET MOUNT SALINE/INK: CPT

## 2025-04-22 PROCEDURE — 87389 HIV-1 AG W/HIV-1&-2 AB AG IA: CPT

## 2025-04-22 PROCEDURE — 84443 ASSAY THYROID STIM HORMONE: CPT

## 2025-04-22 PROCEDURE — 86695 HERPES SIMPLEX TYPE 1 TEST: CPT

## 2025-04-22 PROCEDURE — 85027 COMPLETE CBC AUTOMATED: CPT

## 2025-04-22 PROCEDURE — 36415 COLL VENOUS BLD VENIPUNCTURE: CPT

## 2025-04-23 LAB
CHOLEST SERPL-MCNC: 181 MG/DL
FASTING STATUS PATIENT QL REPORTED: YES
FASTING STATUS PATIENT QL REPORTED: YES
GLUCOSE SERPL-MCNC: 98 MG/DL (ref 70–99)
HCV AB SERPL QL IA: NONREACTIVE
HDLC SERPL-MCNC: 41 MG/DL
LDLC SERPL CALC-MCNC: 127 MG/DL
NONHDLC SERPL-MCNC: 140 MG/DL
T4 FREE SERPL-MCNC: 0.96 NG/DL (ref 0.9–1.7)
THYROPEROXIDASE AB SERPL-ACNC: <10 IU/ML
TRIGL SERPL-MCNC: 66 MG/DL
TSH SERPL DL<=0.005 MIU/L-ACNC: 5.89 UIU/ML (ref 0.3–4.2)

## 2025-04-30 ENCOUNTER — ANCILLARY PROCEDURE (OUTPATIENT)
Dept: ULTRASOUND IMAGING | Facility: CLINIC | Age: 35
End: 2025-04-30
Attending: PHYSICIAN ASSISTANT
Payer: COMMERCIAL

## 2025-04-30 ENCOUNTER — PATIENT OUTREACH (OUTPATIENT)
Dept: FAMILY MEDICINE | Facility: CLINIC | Age: 35
End: 2025-04-30

## 2025-04-30 DIAGNOSIS — R79.89 ELEVATED TSH: ICD-10-CM

## 2025-04-30 DIAGNOSIS — R10.11 RUQ ABDOMINAL PAIN: ICD-10-CM

## 2025-04-30 PROCEDURE — 76536 US EXAM OF HEAD AND NECK: CPT | Mod: TC | Performed by: RADIOLOGY

## 2025-04-30 PROCEDURE — 76705 ECHO EXAM OF ABDOMEN: CPT | Mod: TC | Performed by: RADIOLOGY

## 2025-07-10 DIAGNOSIS — N92.6 IRREGULAR MENSES: ICD-10-CM

## 2025-07-10 RX ORDER — ACETAMINOPHEN AND CODEINE PHOSPHATE 120; 12 MG/5ML; MG/5ML
0.35 SOLUTION ORAL DAILY
Qty: 84 TABLET | Refills: 0 | Status: SHIPPED | OUTPATIENT
Start: 2025-07-10

## 2025-07-10 NOTE — TELEPHONE ENCOUNTER
Requested Prescriptions   Pending Prescriptions Disp Refills    norethindrone (MICRONOR) 0.35 MG tablet [Pharmacy Med Name: NORETHINDRONE 0.35MG TABLETS 28S] 84 tablet 3     Sig: TAKE 1 TABLET(0.35 MG) BY MOUTH DAILY       Contraceptives Protocol Passed - 7/10/2025  8:41 AM        Passed - Patient is not a current smoker if age is 35 or older        Passed - Medication is active on med list and the sig matches. RN to manually verify dose and sig if red X/fail.     If the protocol passes (green check), you do not need to verify med dose and sig.    A prescription matches if they are the same clinical intention.    For Example: once daily and every morning are the same.    The protocol can not identify upper and lower case letters as matching and will fail.     For Example: Take 1 tablet (50 mg) by mouth daily     TAKE 1 TABLET (50 MG) BY MOUTH DAILY    For all fails (red x), verify dose and sig.    If the refill does match what is on file, the RN can still proceed to approve the refill request.       If they do not match, route to the appropriate provider.             Passed - Recent (12 month) or future (90 days) visit with authorizing provider's specialty (provided they have been seen in the past 15 months)     The patient must have completed an in-person or virtual visit within the past 12 months or has a future visit scheduled within the next 90 days with the authorizing provider s specialty.  Urgent care and e-visits do not qualify as an office visit for this protocol.          Passed - Medication indicated for associated diagnosis     Medication is associated with one or more of the following diagnoses:  Contraception  Acne  Dysmenorrhea  Menorrhagia  Amenorrhea  PCOS  Premenstrual Dysphoric Disorder  Irregular menses  Endometriosis  Contraceptive counseling  Finding of menstrual bleeding  Education about oral contraception  Uses contraception  Initial prescription of oral contraception  Oral contraception-no  problem  Oral contraceptive repeat          Passed - No active pregnancy on record        Passed - No positive pregnancy test in past 12 months           Pt last seen 10/7/2025 for irregular menses    Last prescribed 10/7/2024 for 84 tablets with 3 refills    Prescription approved per [facility] Refill Protocol.    Anamaria Toth RN on 7/10/2025 at 8:42 AM